# Patient Record
Sex: MALE | Race: OTHER | HISPANIC OR LATINO
[De-identification: names, ages, dates, MRNs, and addresses within clinical notes are randomized per-mention and may not be internally consistent; named-entity substitution may affect disease eponyms.]

---

## 2017-02-01 ENCOUNTER — APPOINTMENT (OUTPATIENT)
Dept: ORTHOPEDIC SURGERY | Facility: CLINIC | Age: 47
End: 2017-02-01

## 2017-02-01 VITALS — WEIGHT: 180 LBS | BODY MASS INDEX: 28.93 KG/M2 | HEIGHT: 66 IN

## 2017-03-04 ENCOUNTER — OUTPATIENT (OUTPATIENT)
Dept: OUTPATIENT SERVICES | Facility: HOSPITAL | Age: 47
LOS: 1 days | End: 2017-03-04

## 2017-03-04 VITALS
RESPIRATION RATE: 16 BRPM | SYSTOLIC BLOOD PRESSURE: 130 MMHG | TEMPERATURE: 97 F | OXYGEN SATURATION: 98 % | WEIGHT: 182.1 LBS | HEART RATE: 88 BPM | DIASTOLIC BLOOD PRESSURE: 84 MMHG | HEIGHT: 66.5 IN

## 2017-03-04 DIAGNOSIS — S83.241A OTHER TEAR OF MEDIAL MENISCUS, CURRENT INJURY, RIGHT KNEE, INITIAL ENCOUNTER: ICD-10-CM

## 2017-03-04 DIAGNOSIS — S83.241D OTHER TEAR OF MEDIAL MENISCUS, CURRENT INJURY, RIGHT KNEE, SUBSEQUENT ENCOUNTER: ICD-10-CM

## 2017-03-04 DIAGNOSIS — Z98.890 OTHER SPECIFIED POSTPROCEDURAL STATES: Chronic | ICD-10-CM

## 2017-03-04 LAB
BUN SERPL-MCNC: 12 MG/DL — SIGNIFICANT CHANGE UP (ref 7–23)
CALCIUM SERPL-MCNC: 9.2 MG/DL — SIGNIFICANT CHANGE UP (ref 8.4–10.5)
CHLORIDE SERPL-SCNC: 102 MMOL/L — SIGNIFICANT CHANGE UP (ref 98–107)
CO2 SERPL-SCNC: 25 MMOL/L — SIGNIFICANT CHANGE UP (ref 22–31)
CREAT SERPL-MCNC: 0.99 MG/DL — SIGNIFICANT CHANGE UP (ref 0.5–1.3)
GLUCOSE SERPL-MCNC: 73 MG/DL — SIGNIFICANT CHANGE UP (ref 70–99)
HCT VFR BLD CALC: 45.6 % — SIGNIFICANT CHANGE UP (ref 39–50)
HGB BLD-MCNC: 16.1 G/DL — SIGNIFICANT CHANGE UP (ref 13–17)
HIV1 AG SER QL: SIGNIFICANT CHANGE UP
HIV1+2 AB SPEC QL: SIGNIFICANT CHANGE UP
MCHC RBC-ENTMCNC: 30.3 PG — SIGNIFICANT CHANGE UP (ref 27–34)
MCHC RBC-ENTMCNC: 35.3 % — SIGNIFICANT CHANGE UP (ref 32–36)
MCV RBC AUTO: 85.7 FL — SIGNIFICANT CHANGE UP (ref 80–100)
PLATELET # BLD AUTO: 295 K/UL — SIGNIFICANT CHANGE UP (ref 150–400)
PMV BLD: 11 FL — SIGNIFICANT CHANGE UP (ref 7–13)
POTASSIUM SERPL-MCNC: 3.8 MMOL/L — SIGNIFICANT CHANGE UP (ref 3.5–5.3)
POTASSIUM SERPL-SCNC: 3.8 MMOL/L — SIGNIFICANT CHANGE UP (ref 3.5–5.3)
RBC # BLD: 5.32 M/UL — SIGNIFICANT CHANGE UP (ref 4.2–5.8)
RBC # FLD: 12.8 % — SIGNIFICANT CHANGE UP (ref 10.3–14.5)
SODIUM SERPL-SCNC: 144 MMOL/L — SIGNIFICANT CHANGE UP (ref 135–145)
WBC # BLD: 7.37 K/UL — SIGNIFICANT CHANGE UP (ref 3.8–10.5)
WBC # FLD AUTO: 7.37 K/UL — SIGNIFICANT CHANGE UP (ref 3.8–10.5)

## 2017-03-04 NOTE — H&P PST ADULT - MUSCULOSKELETAL
details… detailed exam normal strength/ROM intact/no calf tenderness/no joint swelling/normal/no joint erythema/no joint warmth

## 2017-03-04 NOTE — H&P PST ADULT - NSANTHOSAYNRD_GEN_A_CORE
No. LELAND screening performed.  STOP BANG Legend: 0-2 = LOW Risk; 3-4 = INTERMEDIATE Risk; 5-8 = HIGH Risk/never tested

## 2017-03-04 NOTE — H&P PST ADULT - GASTROINTESTINAL DETAILS
no rebound tenderness/bowel sounds normal/no organomegaly/no guarding/no masses palpable/no bruit/no rigidity/soft/normal/no distention/nontender

## 2017-03-04 NOTE — H&P PST ADULT - NEGATIVE GENERAL SYMPTOMS
no weight gain/no anorexia/no weight loss/no fever/no chills/no polydipsia/no fatigue/no polyphagia/no malaise/no sweating/no polyuria

## 2017-03-04 NOTE — H&P PST ADULT - NEGATIVE BREAST SYMPTOMS
no breast tenderness R/no breast lump R/no nipple discharge L/no breast lump L/no nipple discharge R/no breast tenderness L

## 2017-03-04 NOTE — H&P PST ADULT - NEGATIVE NEUROLOGICAL SYMPTOMS
no hemiparesis/no headache/no vertigo/no difficulty walking/no transient paralysis/no generalized seizures/no weakness/no loss of sensation/no facial palsy/no confusion/no tremors/no focal seizures/no loss of consciousness/no paresthesias/no syncope

## 2017-03-04 NOTE — H&P PST ADULT - NEGATIVE ALLERGY TYPES
no outdoor environmental allergies/no reactions to food/no indoor environmental allergies/no reactions to animals/no reactions to medicines/no reactions to insect bites

## 2017-03-04 NOTE — H&P PST ADULT - NEGATIVE GASTROINTESTINAL SYMPTOMS
no abdominal pain/no hematochezia/no constipation/no nausea/no flatulence/no vomiting/no change in bowel habits/no melena/no diarrhea

## 2017-03-04 NOTE — H&P PST ADULT - RS GEN PE MLT RESP DETAILS PC
good air movement/airway patent/clear to auscultation bilaterally/respirations non-labored/normal/breath sounds equal

## 2017-03-04 NOTE — H&P PST ADULT - NEGATIVE MUSCULOSKELETAL SYMPTOMS
no back pain/no arthritis/no neck pain/no stiffness/no arm pain R/no muscle weakness/no muscle cramps/no leg pain L/no arm pain L/no arthralgia/no myalgia

## 2017-03-04 NOTE — H&P PST ADULT - FAMILY HISTORY
Mother  Still living? Yes, Estimated age: Age Unknown  Family history of diabetes mellitus in mother, Age at diagnosis: Age Unknown

## 2017-03-04 NOTE — H&P PST ADULT - NEGATIVE CARDIOVASCULAR SYMPTOMS
no dyspnea on exertion/no peripheral edema/no palpitations/no orthopnea/no chest pain/no paroxysmal nocturnal dyspnea/no claudication

## 2017-03-04 NOTE — H&P PST ADULT - NEGATIVE GENERAL GENITOURINARY SYMPTOMS
normal libido/no hematuria/no bladder infections/no urine discoloration/normal urinary frequency/no urinary hesitancy/no flank pain R/no dysuria/no incontinence/no renal colic/no nocturia/no flank pain L

## 2017-03-04 NOTE — H&P PST ADULT - NEGATIVE OPHTHALMOLOGIC SYMPTOMS
no loss of vision L/no photophobia/no diplopia/no blurred vision L/no discharge R/no discharge L/no irritation R/no lacrimation L/no loss of vision R/no pain R/no scleral injection R/no irritation L/no blurred vision R/no pain L/no scleral injection L/no lacrimation R

## 2017-03-04 NOTE — H&P PST ADULT - NEGATIVE PSYCHIATRIC SYMPTOMS
no mood swings/no suicidal ideation/no auditory hallucinations/no anxiety/no depression/no paranoia/no hyperactivity/no agitation/no visual hallucinations/no insomnia/no memory loss

## 2017-03-04 NOTE — H&P PST ADULT - PROBLEM SELECTOR PLAN 1
pt is scheduled for a rt knee arthroscopic partial medial meniscectomy on 3/15/17. Preop instructions including Pepcid, Hibiclens and NPO status provided. Verbalized understanding.

## 2017-03-04 NOTE — H&P PST ADULT - NEGATIVE SKIN SYMPTOMS
no dryness/no hair loss/no brittle nails/no tumor/no pitted nails/no rash/no itching/no change in size/color of mole

## 2017-03-04 NOTE — H&P PST ADULT - NEGATIVE ENMT SYMPTOMS
no hearing difficulty/no nose bleeds/no post-nasal discharge/no nasal obstruction/no nasal congestion/no tinnitus/no sinus symptoms/no abnormal taste sensation/no dysphagia/no gum bleeding/no recurrent cold sores/no vertigo/no dry mouth/no throat pain/no ear pain/no nasal discharge

## 2017-03-13 ENCOUNTER — APPOINTMENT (OUTPATIENT)
Dept: ORTHOPEDIC SURGERY | Facility: AMBULATORY SURGERY CENTER | Age: 47
End: 2017-03-13

## 2017-03-13 ENCOUNTER — OUTPATIENT (OUTPATIENT)
Dept: OUTPATIENT SERVICES | Facility: HOSPITAL | Age: 47
LOS: 1 days | Discharge: HOME | End: 2017-03-13
Payer: MEDICAID

## 2017-03-13 ENCOUNTER — TRANSCRIPTION ENCOUNTER (OUTPATIENT)
Age: 47
End: 2017-03-13

## 2017-03-13 VITALS
WEIGHT: 182.98 LBS | DIASTOLIC BLOOD PRESSURE: 96 MMHG | RESPIRATION RATE: 16 BRPM | SYSTOLIC BLOOD PRESSURE: 145 MMHG | HEART RATE: 85 BPM | OXYGEN SATURATION: 97 % | TEMPERATURE: 98 F | HEIGHT: 66.5 IN

## 2017-03-13 VITALS
HEART RATE: 90 BPM | SYSTOLIC BLOOD PRESSURE: 104 MMHG | TEMPERATURE: 98 F | OXYGEN SATURATION: 98 % | RESPIRATION RATE: 12 BRPM | DIASTOLIC BLOOD PRESSURE: 72 MMHG

## 2017-03-13 DIAGNOSIS — S83.241D OTHER TEAR OF MEDIAL MENISCUS, CURRENT INJURY, RIGHT KNEE, SUBSEQUENT ENCOUNTER: ICD-10-CM

## 2017-03-13 DIAGNOSIS — Z98.890 OTHER SPECIFIED POSTPROCEDURAL STATES: Chronic | ICD-10-CM

## 2017-03-13 PROCEDURE — 29881 ARTHRS KNE SRG MNISECTMY M/L: CPT | Mod: RT

## 2017-03-13 RX ORDER — OXYCODONE HYDROCHLORIDE 5 MG/1
1 TABLET ORAL
Qty: 50 | Refills: 0
Start: 2017-03-13

## 2017-03-13 NOTE — ASU DISCHARGE PLAN (ADULT/PEDIATRIC). - SPECIAL INSTRUCTIONS
Take over the counter stool softener to help with constipation from pain meds   Increase fluids in diet  Ice pack for 20-30 minutes at a time every 3-4 hrs  Elevate leg for first night to reduce pain and swelling

## 2017-03-13 NOTE — ASU DISCHARGE PLAN (ADULT/PEDIATRIC). - NOTIFY
Bleeding that does not stop/Numbness, color, or temperature change to extremity/Inability to Tolerate Liquids or Foods/Fever greater than 101/Swelling that continues/Pain not relieved by Medications/Persistent Nausea and Vomiting/Unable to Urinate

## 2017-03-13 NOTE — ASU DISCHARGE PLAN (ADULT/PEDIATRIC). - ACTIVITY LEVEL
no sports/gym/no swimming, no hot tubs/weight bearing as tolerated/elevate extremity/no heavy lifting

## 2017-03-23 ENCOUNTER — APPOINTMENT (OUTPATIENT)
Dept: ORTHOPEDIC SURGERY | Facility: CLINIC | Age: 47
End: 2017-03-23

## 2017-03-23 DIAGNOSIS — M65.9 SYNOVITIS AND TENOSYNOVITIS, UNSPECIFIED: ICD-10-CM

## 2017-03-23 RX ORDER — DICLOFENAC SODIUM 75 MG/1
75 TABLET, DELAYED RELEASE ORAL
Qty: 42 | Refills: 0 | Status: ACTIVE | COMMUNITY
Start: 2017-03-23 | End: 1900-01-01

## 2017-04-04 ENCOUNTER — APPOINTMENT (OUTPATIENT)
Dept: ORTHOPEDIC SURGERY | Facility: CLINIC | Age: 47
End: 2017-04-04

## 2017-04-04 DIAGNOSIS — M25.561 PAIN IN RIGHT KNEE: ICD-10-CM

## 2017-04-04 DIAGNOSIS — S83.241D OTHER TEAR OF MEDIAL MENISCUS, CURRENT INJURY, RIGHT KNEE, SUBSEQUENT ENCOUNTER: ICD-10-CM

## 2017-05-10 ENCOUNTER — APPOINTMENT (OUTPATIENT)
Dept: ORTHOPEDIC SURGERY | Facility: CLINIC | Age: 47
End: 2017-05-10

## 2017-05-22 ENCOUNTER — APPOINTMENT (OUTPATIENT)
Dept: ORTHOPEDIC SURGERY | Facility: CLINIC | Age: 47
End: 2017-05-22

## 2017-05-22 DIAGNOSIS — M23.321 OTHER MENISCUS DERANGEMENTS, POSTERIOR HORN OF MEDIAL MENISCUS, RIGHT KNEE: ICD-10-CM

## 2020-11-24 ENCOUNTER — EMERGENCY (EMERGENCY)
Facility: HOSPITAL | Age: 50
LOS: 1 days | Discharge: HOME | End: 2020-11-24
Attending: EMERGENCY MEDICINE | Admitting: EMERGENCY MEDICINE
Payer: MEDICAID

## 2020-11-24 VITALS
OXYGEN SATURATION: 99 % | DIASTOLIC BLOOD PRESSURE: 89 MMHG | HEART RATE: 79 BPM | SYSTOLIC BLOOD PRESSURE: 164 MMHG | RESPIRATION RATE: 20 BRPM

## 2020-11-24 VITALS
RESPIRATION RATE: 20 BRPM | HEIGHT: 66.5 IN | TEMPERATURE: 97 F | OXYGEN SATURATION: 100 % | SYSTOLIC BLOOD PRESSURE: 122 MMHG | HEART RATE: 69 BPM | DIASTOLIC BLOOD PRESSURE: 90 MMHG | WEIGHT: 175.05 LBS

## 2020-11-24 DIAGNOSIS — Z98.890 OTHER SPECIFIED POSTPROCEDURAL STATES: Chronic | ICD-10-CM

## 2020-11-24 LAB
ALBUMIN SERPL ELPH-MCNC: 4.3 G/DL — SIGNIFICANT CHANGE UP (ref 3.3–5)
ALP SERPL-CCNC: 92 U/L — SIGNIFICANT CHANGE UP (ref 30–120)
ALT FLD-CCNC: 23 U/L DA — SIGNIFICANT CHANGE UP (ref 10–60)
ANION GAP SERPL CALC-SCNC: 8 MMOL/L — SIGNIFICANT CHANGE UP (ref 5–17)
APPEARANCE UR: CLEAR — SIGNIFICANT CHANGE UP
AST SERPL-CCNC: 16 U/L — SIGNIFICANT CHANGE UP (ref 10–40)
BASOPHILS # BLD AUTO: 0.04 K/UL — SIGNIFICANT CHANGE UP (ref 0–0.2)
BASOPHILS NFR BLD AUTO: 0.4 % — SIGNIFICANT CHANGE UP (ref 0–2)
BILIRUB SERPL-MCNC: 0.6 MG/DL — SIGNIFICANT CHANGE UP (ref 0.2–1.2)
BILIRUB UR-MCNC: NEGATIVE — SIGNIFICANT CHANGE UP
BUN SERPL-MCNC: 14 MG/DL — SIGNIFICANT CHANGE UP (ref 7–23)
CALCIUM SERPL-MCNC: 9.4 MG/DL — SIGNIFICANT CHANGE UP (ref 8.4–10.5)
CHLORIDE SERPL-SCNC: 103 MMOL/L — SIGNIFICANT CHANGE UP (ref 96–108)
CO2 SERPL-SCNC: 29 MMOL/L — SIGNIFICANT CHANGE UP (ref 22–31)
COLOR SPEC: YELLOW — SIGNIFICANT CHANGE UP
CREAT SERPL-MCNC: 1.09 MG/DL — SIGNIFICANT CHANGE UP (ref 0.5–1.3)
DIFF PNL FLD: ABNORMAL
EOSINOPHIL # BLD AUTO: 0.08 K/UL — SIGNIFICANT CHANGE UP (ref 0–0.5)
EOSINOPHIL NFR BLD AUTO: 0.8 % — SIGNIFICANT CHANGE UP (ref 0–6)
GLUCOSE SERPL-MCNC: 146 MG/DL — HIGH (ref 70–99)
GLUCOSE UR QL: NEGATIVE MG/DL — SIGNIFICANT CHANGE UP
HCT VFR BLD CALC: 46.5 % — SIGNIFICANT CHANGE UP (ref 39–50)
HGB BLD-MCNC: 15.9 G/DL — SIGNIFICANT CHANGE UP (ref 13–17)
IMM GRANULOCYTES NFR BLD AUTO: 0.2 % — SIGNIFICANT CHANGE UP (ref 0–1.5)
KETONES UR-MCNC: NEGATIVE — SIGNIFICANT CHANGE UP
LEUKOCYTE ESTERASE UR-ACNC: NEGATIVE — SIGNIFICANT CHANGE UP
LYMPHOCYTES # BLD AUTO: 2.83 K/UL — SIGNIFICANT CHANGE UP (ref 1–3.3)
LYMPHOCYTES # BLD AUTO: 29.7 % — SIGNIFICANT CHANGE UP (ref 13–44)
MCHC RBC-ENTMCNC: 29.1 PG — SIGNIFICANT CHANGE UP (ref 27–34)
MCHC RBC-ENTMCNC: 34.2 GM/DL — SIGNIFICANT CHANGE UP (ref 32–36)
MCV RBC AUTO: 85 FL — SIGNIFICANT CHANGE UP (ref 80–100)
MONOCYTES # BLD AUTO: 0.68 K/UL — SIGNIFICANT CHANGE UP (ref 0–0.9)
MONOCYTES NFR BLD AUTO: 7.1 % — SIGNIFICANT CHANGE UP (ref 2–14)
NEUTROPHILS # BLD AUTO: 5.88 K/UL — SIGNIFICANT CHANGE UP (ref 1.8–7.4)
NEUTROPHILS NFR BLD AUTO: 61.8 % — SIGNIFICANT CHANGE UP (ref 43–77)
NITRITE UR-MCNC: NEGATIVE — SIGNIFICANT CHANGE UP
NRBC # BLD: 0 /100 WBCS — SIGNIFICANT CHANGE UP (ref 0–0)
PH UR: 7 — SIGNIFICANT CHANGE UP (ref 5–8)
PLATELET # BLD AUTO: 351 K/UL — SIGNIFICANT CHANGE UP (ref 150–400)
POTASSIUM SERPL-MCNC: 3.7 MMOL/L — SIGNIFICANT CHANGE UP (ref 3.5–5.3)
POTASSIUM SERPL-SCNC: 3.7 MMOL/L — SIGNIFICANT CHANGE UP (ref 3.5–5.3)
PROT SERPL-MCNC: 8.4 G/DL — HIGH (ref 6–8.3)
PROT UR-MCNC: NEGATIVE MG/DL — SIGNIFICANT CHANGE UP
RBC # BLD: 5.47 M/UL — SIGNIFICANT CHANGE UP (ref 4.2–5.8)
RBC # FLD: 12.1 % — SIGNIFICANT CHANGE UP (ref 10.3–14.5)
SODIUM SERPL-SCNC: 140 MMOL/L — SIGNIFICANT CHANGE UP (ref 135–145)
SP GR SPEC: 1.01 — SIGNIFICANT CHANGE UP (ref 1.01–1.02)
UROBILINOGEN FLD QL: NEGATIVE MG/DL — SIGNIFICANT CHANGE UP
WBC # BLD: 9.53 K/UL — SIGNIFICANT CHANGE UP (ref 3.8–10.5)
WBC # FLD AUTO: 9.53 K/UL — SIGNIFICANT CHANGE UP (ref 3.8–10.5)

## 2020-11-24 PROCEDURE — 74176 CT ABD & PELVIS W/O CONTRAST: CPT | Mod: 26

## 2020-11-24 PROCEDURE — 87086 URINE CULTURE/COLONY COUNT: CPT

## 2020-11-24 PROCEDURE — 74176 CT ABD & PELVIS W/O CONTRAST: CPT

## 2020-11-24 PROCEDURE — 36415 COLL VENOUS BLD VENIPUNCTURE: CPT

## 2020-11-24 PROCEDURE — 96374 THER/PROPH/DIAG INJ IV PUSH: CPT

## 2020-11-24 PROCEDURE — 96375 TX/PRO/DX INJ NEW DRUG ADDON: CPT

## 2020-11-24 PROCEDURE — 80053 COMPREHEN METABOLIC PANEL: CPT

## 2020-11-24 PROCEDURE — 85025 COMPLETE CBC W/AUTO DIFF WBC: CPT

## 2020-11-24 PROCEDURE — 99284 EMERGENCY DEPT VISIT MOD MDM: CPT

## 2020-11-24 PROCEDURE — 96361 HYDRATE IV INFUSION ADD-ON: CPT

## 2020-11-24 PROCEDURE — 81001 URINALYSIS AUTO W/SCOPE: CPT

## 2020-11-24 PROCEDURE — 99284 EMERGENCY DEPT VISIT MOD MDM: CPT | Mod: 25

## 2020-11-24 RX ORDER — CEFUROXIME AXETIL 250 MG
500 TABLET ORAL ONCE
Refills: 0 | Status: COMPLETED | OUTPATIENT
Start: 2020-11-24 | End: 2020-11-24

## 2020-11-24 RX ORDER — KETOROLAC TROMETHAMINE 30 MG/ML
15 SYRINGE (ML) INJECTION ONCE
Refills: 0 | Status: DISCONTINUED | OUTPATIENT
Start: 2020-11-24 | End: 2020-11-24

## 2020-11-24 RX ORDER — ONDANSETRON 8 MG/1
4 TABLET, FILM COATED ORAL ONCE
Refills: 0 | Status: COMPLETED | OUTPATIENT
Start: 2020-11-24 | End: 2020-11-24

## 2020-11-24 RX ORDER — CEFUROXIME AXETIL 250 MG
1 TABLET ORAL
Qty: 14 | Refills: 0
Start: 2020-11-24 | End: 2020-11-30

## 2020-11-24 RX ORDER — OXYCODONE AND ACETAMINOPHEN 5; 325 MG/1; MG/1
1 TABLET ORAL
Qty: 12 | Refills: 0
Start: 2020-11-24 | End: 2020-11-26

## 2020-11-24 RX ORDER — SODIUM CHLORIDE 9 MG/ML
1000 INJECTION INTRAMUSCULAR; INTRAVENOUS; SUBCUTANEOUS ONCE
Refills: 0 | Status: COMPLETED | OUTPATIENT
Start: 2020-11-24 | End: 2020-11-24

## 2020-11-24 RX ORDER — TAMSULOSIN HYDROCHLORIDE 0.4 MG/1
1 CAPSULE ORAL
Qty: 10 | Refills: 0
Start: 2020-11-24 | End: 2020-12-03

## 2020-11-24 RX ORDER — ONDANSETRON 8 MG/1
1 TABLET, FILM COATED ORAL
Qty: 9 | Refills: 0
Start: 2020-11-24 | End: 2020-11-26

## 2020-11-24 RX ADMIN — Medication 500 MILLIGRAM(S): at 08:17

## 2020-11-24 RX ADMIN — Medication 15 MILLIGRAM(S): at 06:38

## 2020-11-24 RX ADMIN — ONDANSETRON 4 MILLIGRAM(S): 8 TABLET, FILM COATED ORAL at 06:12

## 2020-11-24 RX ADMIN — SODIUM CHLORIDE 1000 MILLILITER(S): 9 INJECTION INTRAMUSCULAR; INTRAVENOUS; SUBCUTANEOUS at 06:24

## 2020-11-24 RX ADMIN — Medication 15 MILLIGRAM(S): at 06:25

## 2020-11-24 RX ADMIN — SODIUM CHLORIDE 1000 MILLILITER(S): 9 INJECTION INTRAMUSCULAR; INTRAVENOUS; SUBCUTANEOUS at 07:18

## 2020-11-24 NOTE — ED PROVIDER NOTE - CARE PROVIDER_API CALL
Gurwinder Lund)  Urology  [address], [address]  [place] [address]  Phone: [phone]  Fax: [phone]  Follow Up Time: 1-3 Days

## 2020-11-24 NOTE — ED PROVIDER NOTE - PATIENT PORTAL LINK FT
You can access the FollowMyHealth Patient Portal offered by [facility] by registering at the following website: [email or web address]. By joining [employer]’s FollowMyHealth portal, you will also be able to view your health information using other applications (apps) compatible with our system.

## 2020-11-24 NOTE — ED PROVIDER NOTE - OBJECTIVE STATEMENT
50 y.o. M c/o right flank pain starting about 1 hr ago, lower flank into groin, associated with urinary urgency, no fever, no n/v, no dysuria, no hematuria, does not feel like msk back pain, 50 y.o. M c/o right flank pain starting about 1 hr ago, lower flank into groin, associated with urinary urgency, no fever, no n/v, no dysuria, no hematuria, does not feel like msk back pain, never had anything like this before

## 2020-11-24 NOTE — ED PROVIDER NOTE - CLINICAL SUMMARY MEDICAL DECISION MAKING FREE TEXT BOX
50 y.o. M c/o acute right flank pain - likely renal stone - iv, fluids, pain control, ct eval for stone, labs/ua

## 2020-11-24 NOTE — ED ADULT TRIAGE NOTE - CHIEF COMPLAINT QUOTE
I have this left lower back pain, I have an urge to pee but I cant I have this left lower back pain, I have an urge to pee but I cant.

## 2020-11-24 NOTE — ED PROVIDER NOTE - NSFOLLOWUPINSTRUCTIONS_ED_ALL_ED_FT
Kidney Stones    WHAT YOU NEED TO KNOW:    Kidney stones form in the urinary system when the water and waste in your urine are out of balance. When this happens, certain types of waste crystals separate from the urine. The crystals build up and form kidney stones. You may have more than one kidney stone.     Kidney Stones          DISCHARGE INSTRUCTIONS:    Return to the emergency department if:   •You have vomiting that is not relieved by medicine.          Contact your healthcare provider if:   •You have a fever.       •You have trouble passing urine.      •You see blood in your urine.      •You have severe pain.      •You have any questions or concerns about your condition or care.      Medicines:   •NSAIDs, such as ibuprofen, help decrease swelling, pain, and fever. This medicine is available with or without a doctor's order. NSAIDs can cause stomach bleeding or kidney problems in certain people. If you take blood thinner medicine, always ask your healthcare provider if NSAIDs are safe for you. Always read the medicine label and follow directions.      •Prescription pain medicine may be given. Ask your healthcare provider how to take this medicine safely. Some prescription pain medicines contain acetaminophen. Do not take other medicines that contain acetaminophen without talking to your healthcare provider. Too much acetaminophen may cause liver damage. Prescription pain medicine may cause constipation. Ask your healthcare provider how to prevent or treat constipation.       •Medicines to balance your electrolytes may be needed.       •Take your medicine as directed. Contact your healthcare provider if you think your medicine is not helping or if you have side effects. Tell him or her if you are allergic to any medicine. Keep a list of the medicines, vitamins, and herbs you take. Include the amounts, and when and why you take them. Bring the list or the pill bottles to follow-up visits. Carry your medicine list with you in case of an emergency.      Follow up with your healthcare provider as directed: You may need to return for more tests. Write down your questions so you remember to ask them during your visits.    What you can do to manage kidney stones:   •Drink more liquids. Your healthcare provider may tell you to drink at least 8 to 12 (eight-ounce) cups of liquids each day. This helps flush out the kidney stones when you urinate. Water is the best liquid to drink.      •Strain your urine every time you go to the bathroom. Urinate through a strainer or a piece of thin cloth to catch the stones. Take the stones to your healthcare provider so they can be sent to the lab for tests. This will help your healthcare providers plan the best treatment for you.  Look for Stones in the Filter           •Eat a variety of healthy foods. Healthy foods include fruits, vegetables, whole-grain breads, low-fat dairy products, beans, and fish. You may need to limit how much sodium (salt) or protein you eat. Ask for information about the best foods for you.      •Stay active. Your stones may pass more easily if you stay active. Exercise can also help you manage your weight. Ask about the best activities for you.      After you pass the kidney stones: Your healthcare provider may order a 24-hour urine test. Results from a 24-hour urine test will help your healthcare provider plan ways to prevent more stones from forming. Your healthcare provider will give you more instructions.

## 2020-11-25 LAB
CULTURE RESULTS: SIGNIFICANT CHANGE UP
SPECIMEN SOURCE: SIGNIFICANT CHANGE UP

## 2020-11-29 ENCOUNTER — EMERGENCY (EMERGENCY)
Facility: HOSPITAL | Age: 50
LOS: 1 days | Discharge: ANOTHER HOSPITAL | End: 2020-11-29
Attending: EMERGENCY MEDICINE | Admitting: EMERGENCY MEDICINE
Payer: MEDICAID

## 2020-11-29 ENCOUNTER — INPATIENT (INPATIENT)
Facility: HOSPITAL | Age: 50
LOS: 1 days | Discharge: HOME | DRG: 854 | End: 2020-12-01
Attending: FAMILY MEDICINE | Admitting: HOSPITALIST
Payer: MEDICAID

## 2020-11-29 ENCOUNTER — TRANSCRIPTION ENCOUNTER (OUTPATIENT)
Age: 50
End: 2020-11-29

## 2020-11-29 VITALS
TEMPERATURE: 98 F | DIASTOLIC BLOOD PRESSURE: 95 MMHG | OXYGEN SATURATION: 98 % | RESPIRATION RATE: 20 BRPM | SYSTOLIC BLOOD PRESSURE: 153 MMHG | HEIGHT: 66.5 IN | HEART RATE: 113 BPM | WEIGHT: 175.05 LBS

## 2020-11-29 VITALS
OXYGEN SATURATION: 100 % | DIASTOLIC BLOOD PRESSURE: 76 MMHG | RESPIRATION RATE: 22 BRPM | HEART RATE: 108 BPM | SYSTOLIC BLOOD PRESSURE: 156 MMHG

## 2020-11-29 VITALS
HEIGHT: 66 IN | DIASTOLIC BLOOD PRESSURE: 81 MMHG | RESPIRATION RATE: 15 BRPM | WEIGHT: 175.05 LBS | OXYGEN SATURATION: 96 % | HEART RATE: 112 BPM | TEMPERATURE: 97 F | SYSTOLIC BLOOD PRESSURE: 141 MMHG

## 2020-11-29 DIAGNOSIS — Z98.890 OTHER SPECIFIED POSTPROCEDURAL STATES: Chronic | ICD-10-CM

## 2020-11-29 DIAGNOSIS — N23 UNSPECIFIED RENAL COLIC: ICD-10-CM

## 2020-11-29 DIAGNOSIS — N17.9 ACUTE KIDNEY FAILURE, UNSPECIFIED: ICD-10-CM

## 2020-11-29 DIAGNOSIS — Z29.9 ENCOUNTER FOR PROPHYLACTIC MEASURES, UNSPECIFIED: ICD-10-CM

## 2020-11-29 DIAGNOSIS — I10 ESSENTIAL (PRIMARY) HYPERTENSION: ICD-10-CM

## 2020-11-29 DIAGNOSIS — A41.9 SEPSIS, UNSPECIFIED ORGANISM: ICD-10-CM

## 2020-11-29 DIAGNOSIS — N20.0 CALCULUS OF KIDNEY: ICD-10-CM

## 2020-11-29 DIAGNOSIS — E87.1 HYPO-OSMOLALITY AND HYPONATREMIA: ICD-10-CM

## 2020-11-29 DIAGNOSIS — K59.00 CONSTIPATION, UNSPECIFIED: ICD-10-CM

## 2020-11-29 LAB
ALBUMIN SERPL ELPH-MCNC: 2.6 G/DL — LOW (ref 3.3–5)
ALP SERPL-CCNC: 162 U/L — HIGH (ref 40–120)
ALT FLD-CCNC: 33 U/L — SIGNIFICANT CHANGE UP (ref 12–78)
ANION GAP SERPL CALC-SCNC: 10 MMOL/L — SIGNIFICANT CHANGE UP (ref 5–17)
ANION GAP SERPL CALC-SCNC: 6 MMOL/L — SIGNIFICANT CHANGE UP (ref 5–17)
ANION GAP SERPL CALC-SCNC: 7 MMOL/L — SIGNIFICANT CHANGE UP (ref 5–17)
APPEARANCE UR: CLEAR — SIGNIFICANT CHANGE UP
AST SERPL-CCNC: 15 U/L — SIGNIFICANT CHANGE UP (ref 15–37)
BASOPHILS # BLD AUTO: 0.02 K/UL — SIGNIFICANT CHANGE UP (ref 0–0.2)
BASOPHILS # BLD AUTO: 0.03 K/UL — SIGNIFICANT CHANGE UP (ref 0–0.2)
BASOPHILS # BLD AUTO: 0.04 K/UL — SIGNIFICANT CHANGE UP (ref 0–0.2)
BASOPHILS NFR BLD AUTO: 0.1 % — SIGNIFICANT CHANGE UP (ref 0–2)
BASOPHILS NFR BLD AUTO: 0.1 % — SIGNIFICANT CHANGE UP (ref 0–2)
BASOPHILS NFR BLD AUTO: 0.2 % — SIGNIFICANT CHANGE UP (ref 0–2)
BILIRUB SERPL-MCNC: 2.3 MG/DL — HIGH (ref 0.2–1.2)
BILIRUB UR-MCNC: NEGATIVE — SIGNIFICANT CHANGE UP
BUN SERPL-MCNC: 13 MG/DL — SIGNIFICANT CHANGE UP (ref 7–23)
BUN SERPL-MCNC: 15 MG/DL — SIGNIFICANT CHANGE UP (ref 7–23)
BUN SERPL-MCNC: 17 MG/DL — SIGNIFICANT CHANGE UP (ref 7–23)
CALCIUM SERPL-MCNC: 8.5 MG/DL — SIGNIFICANT CHANGE UP (ref 8.5–10.1)
CALCIUM SERPL-MCNC: 8.5 MG/DL — SIGNIFICANT CHANGE UP (ref 8.5–10.1)
CALCIUM SERPL-MCNC: 9.3 MG/DL — SIGNIFICANT CHANGE UP (ref 8.4–10.5)
CHLORIDE SERPL-SCNC: 102 MMOL/L — SIGNIFICANT CHANGE UP (ref 96–108)
CHLORIDE SERPL-SCNC: 106 MMOL/L — SIGNIFICANT CHANGE UP (ref 96–108)
CHLORIDE SERPL-SCNC: 89 MMOL/L — LOW (ref 96–108)
CO2 SERPL-SCNC: 25 MMOL/L — SIGNIFICANT CHANGE UP (ref 22–31)
CO2 SERPL-SCNC: 27 MMOL/L — SIGNIFICANT CHANGE UP (ref 22–31)
CO2 SERPL-SCNC: 28 MMOL/L — SIGNIFICANT CHANGE UP (ref 22–31)
COLOR SPEC: YELLOW — SIGNIFICANT CHANGE UP
CREAT SERPL-MCNC: 1.7 MG/DL — HIGH (ref 0.5–1.3)
CREAT SERPL-MCNC: 1.84 MG/DL — HIGH (ref 0.5–1.3)
CREAT SERPL-MCNC: 2 MG/DL — HIGH (ref 0.5–1.3)
DIFF PNL FLD: ABNORMAL
EOSINOPHIL # BLD AUTO: 0.03 K/UL — SIGNIFICANT CHANGE UP (ref 0–0.5)
EOSINOPHIL # BLD AUTO: 0.03 K/UL — SIGNIFICANT CHANGE UP (ref 0–0.5)
EOSINOPHIL # BLD AUTO: 0.12 K/UL — SIGNIFICANT CHANGE UP (ref 0–0.5)
EOSINOPHIL NFR BLD AUTO: 0.1 % — SIGNIFICANT CHANGE UP (ref 0–6)
EOSINOPHIL NFR BLD AUTO: 0.1 % — SIGNIFICANT CHANGE UP (ref 0–6)
EOSINOPHIL NFR BLD AUTO: 0.7 % — SIGNIFICANT CHANGE UP (ref 0–6)
GLUCOSE SERPL-MCNC: 102 MG/DL — HIGH (ref 70–99)
GLUCOSE SERPL-MCNC: 129 MG/DL — HIGH (ref 70–99)
GLUCOSE SERPL-MCNC: 159 MG/DL — HIGH (ref 70–99)
GLUCOSE UR QL: NEGATIVE MG/DL — SIGNIFICANT CHANGE UP
HCT VFR BLD CALC: 33.6 % — LOW (ref 39–50)
HCT VFR BLD CALC: 35.2 % — LOW (ref 39–50)
HCT VFR BLD CALC: 39.5 % — SIGNIFICANT CHANGE UP (ref 39–50)
HGB BLD-MCNC: 11.6 G/DL — LOW (ref 13–17)
HGB BLD-MCNC: 12.3 G/DL — LOW (ref 13–17)
HGB BLD-MCNC: 13.9 G/DL — SIGNIFICANT CHANGE UP (ref 13–17)
IMM GRANULOCYTES NFR BLD AUTO: 0.8 % — SIGNIFICANT CHANGE UP (ref 0–1.5)
IMM GRANULOCYTES NFR BLD AUTO: 0.8 % — SIGNIFICANT CHANGE UP (ref 0–1.5)
IMM GRANULOCYTES NFR BLD AUTO: 1.3 % — SIGNIFICANT CHANGE UP (ref 0–1.5)
KETONES UR-MCNC: NEGATIVE — SIGNIFICANT CHANGE UP
LACTATE SERPL-SCNC: 0.8 MMOL/L — SIGNIFICANT CHANGE UP (ref 0.7–2)
LEUKOCYTE ESTERASE UR-ACNC: NEGATIVE — SIGNIFICANT CHANGE UP
LYMPHOCYTES # BLD AUTO: 0.53 K/UL — LOW (ref 1–3.3)
LYMPHOCYTES # BLD AUTO: 0.56 K/UL — LOW (ref 1–3.3)
LYMPHOCYTES # BLD AUTO: 1.15 K/UL — SIGNIFICANT CHANGE UP (ref 1–3.3)
LYMPHOCYTES # BLD AUTO: 2.8 % — LOW (ref 13–44)
LYMPHOCYTES # BLD AUTO: 3.1 % — LOW (ref 13–44)
LYMPHOCYTES # BLD AUTO: 4.8 % — LOW (ref 13–44)
MCHC RBC-ENTMCNC: 29.3 PG — SIGNIFICANT CHANGE UP (ref 27–34)
MCHC RBC-ENTMCNC: 29.4 PG — SIGNIFICANT CHANGE UP (ref 27–34)
MCHC RBC-ENTMCNC: 29.5 PG — SIGNIFICANT CHANGE UP (ref 27–34)
MCHC RBC-ENTMCNC: 34.5 GM/DL — SIGNIFICANT CHANGE UP (ref 32–36)
MCHC RBC-ENTMCNC: 34.9 GM/DL — SIGNIFICANT CHANGE UP (ref 32–36)
MCHC RBC-ENTMCNC: 35.2 GM/DL — SIGNIFICANT CHANGE UP (ref 32–36)
MCV RBC AUTO: 83.3 FL — SIGNIFICANT CHANGE UP (ref 80–100)
MCV RBC AUTO: 84 FL — SIGNIFICANT CHANGE UP (ref 80–100)
MCV RBC AUTO: 85.5 FL — SIGNIFICANT CHANGE UP (ref 80–100)
MONOCYTES # BLD AUTO: 1.25 K/UL — HIGH (ref 0–0.9)
MONOCYTES # BLD AUTO: 1.49 K/UL — HIGH (ref 0–0.9)
MONOCYTES # BLD AUTO: 2.06 K/UL — HIGH (ref 0–0.9)
MONOCYTES NFR BLD AUTO: 7.4 % — SIGNIFICANT CHANGE UP (ref 2–14)
MONOCYTES NFR BLD AUTO: 7.4 % — SIGNIFICANT CHANGE UP (ref 2–14)
MONOCYTES NFR BLD AUTO: 8.6 % — SIGNIFICANT CHANGE UP (ref 2–14)
NEUTROPHILS # BLD AUTO: 14.92 K/UL — HIGH (ref 1.8–7.4)
NEUTROPHILS # BLD AUTO: 17.76 K/UL — HIGH (ref 1.8–7.4)
NEUTROPHILS # BLD AUTO: 20.41 K/UL — HIGH (ref 1.8–7.4)
NEUTROPHILS NFR BLD AUTO: 85.5 % — HIGH (ref 43–77)
NEUTROPHILS NFR BLD AUTO: 87.9 % — HIGH (ref 43–77)
NEUTROPHILS NFR BLD AUTO: 88.3 % — HIGH (ref 43–77)
NITRITE UR-MCNC: NEGATIVE — SIGNIFICANT CHANGE UP
NRBC # BLD: 0 /100 WBCS — SIGNIFICANT CHANGE UP (ref 0–0)
PH UR: 7 — SIGNIFICANT CHANGE UP (ref 5–8)
PLATELET # BLD AUTO: 283 K/UL — SIGNIFICANT CHANGE UP (ref 150–400)
PLATELET # BLD AUTO: 291 K/UL — SIGNIFICANT CHANGE UP (ref 150–400)
PLATELET # BLD AUTO: 303 K/UL — SIGNIFICANT CHANGE UP (ref 150–400)
POTASSIUM SERPL-MCNC: 3.5 MMOL/L — SIGNIFICANT CHANGE UP (ref 3.5–5.3)
POTASSIUM SERPL-MCNC: 3.9 MMOL/L — SIGNIFICANT CHANGE UP (ref 3.5–5.3)
POTASSIUM SERPL-MCNC: 4.1 MMOL/L — SIGNIFICANT CHANGE UP (ref 3.5–5.3)
POTASSIUM SERPL-SCNC: 3.5 MMOL/L — SIGNIFICANT CHANGE UP (ref 3.5–5.3)
POTASSIUM SERPL-SCNC: 3.9 MMOL/L — SIGNIFICANT CHANGE UP (ref 3.5–5.3)
POTASSIUM SERPL-SCNC: 4.1 MMOL/L — SIGNIFICANT CHANGE UP (ref 3.5–5.3)
PROT SERPL-MCNC: 6.9 G/DL — SIGNIFICANT CHANGE UP (ref 6–8.3)
PROT UR-MCNC: 15 MG/DL
RBC # BLD: 3.93 M/UL — LOW (ref 4.2–5.8)
RBC # BLD: 4.19 M/UL — LOW (ref 4.2–5.8)
RBC # BLD: 4.74 M/UL — SIGNIFICANT CHANGE UP (ref 4.2–5.8)
RBC # FLD: 12.3 % — SIGNIFICANT CHANGE UP (ref 10.3–14.5)
RBC # FLD: 12.7 % — SIGNIFICANT CHANGE UP (ref 10.3–14.5)
RBC # FLD: 13 % — SIGNIFICANT CHANGE UP (ref 10.3–14.5)
SARS-COV-2 IGG SERPL QL IA: NEGATIVE — SIGNIFICANT CHANGE UP
SARS-COV-2 IGM SERPL IA-ACNC: 0.08 INDEX — SIGNIFICANT CHANGE UP
SARS-COV-2 RNA SPEC QL NAA+PROBE: SIGNIFICANT CHANGE UP
SODIUM SERPL-SCNC: 124 MMOL/L — LOW (ref 135–145)
SODIUM SERPL-SCNC: 136 MMOL/L — SIGNIFICANT CHANGE UP (ref 135–145)
SODIUM SERPL-SCNC: 140 MMOL/L — SIGNIFICANT CHANGE UP (ref 135–145)
SP GR SPEC: 1 — LOW (ref 1.01–1.02)
UROBILINOGEN FLD QL: NEGATIVE MG/DL — SIGNIFICANT CHANGE UP
WBC # BLD: 16.98 K/UL — HIGH (ref 3.8–10.5)
WBC # BLD: 20.13 K/UL — HIGH (ref 3.8–10.5)
WBC # BLD: 23.88 K/UL — HIGH (ref 3.8–10.5)
WBC # FLD AUTO: 16.98 K/UL — HIGH (ref 3.8–10.5)
WBC # FLD AUTO: 20.13 K/UL — HIGH (ref 3.8–10.5)
WBC # FLD AUTO: 23.88 K/UL — HIGH (ref 3.8–10.5)

## 2020-11-29 PROCEDURE — 99223 1ST HOSP IP/OBS HIGH 75: CPT | Mod: GC

## 2020-11-29 PROCEDURE — 74176 CT ABD & PELVIS W/O CONTRAST: CPT | Mod: 26

## 2020-11-29 PROCEDURE — 96375 TX/PRO/DX INJ NEW DRUG ADDON: CPT

## 2020-11-29 PROCEDURE — 99285 EMERGENCY DEPT VISIT HI MDM: CPT | Mod: 25

## 2020-11-29 PROCEDURE — 96365 THER/PROPH/DIAG IV INF INIT: CPT

## 2020-11-29 PROCEDURE — 85025 COMPLETE CBC W/AUTO DIFF WBC: CPT

## 2020-11-29 PROCEDURE — 99283 EMERGENCY DEPT VISIT LOW MDM: CPT

## 2020-11-29 PROCEDURE — 93010 ELECTROCARDIOGRAM REPORT: CPT

## 2020-11-29 PROCEDURE — 99233 SBSQ HOSP IP/OBS HIGH 50: CPT | Mod: GC

## 2020-11-29 PROCEDURE — 81001 URINALYSIS AUTO W/SCOPE: CPT

## 2020-11-29 PROCEDURE — 80048 BASIC METABOLIC PNL TOTAL CA: CPT

## 2020-11-29 PROCEDURE — 99285 EMERGENCY DEPT VISIT HI MDM: CPT

## 2020-11-29 PROCEDURE — 96361 HYDRATE IV INFUSION ADD-ON: CPT

## 2020-11-29 PROCEDURE — 36415 COLL VENOUS BLD VENIPUNCTURE: CPT

## 2020-11-29 PROCEDURE — 74176 CT ABD & PELVIS W/O CONTRAST: CPT

## 2020-11-29 RX ORDER — MULTIVIT WITH MIN/MFOLATE/K2 340-15/3 G
1 POWDER (GRAM) ORAL ONCE
Refills: 0 | Status: COMPLETED | OUTPATIENT
Start: 2020-11-29 | End: 2020-11-29

## 2020-11-29 RX ORDER — ACETAMINOPHEN 500 MG
1000 TABLET ORAL EVERY 6 HOURS
Refills: 0 | Status: DISCONTINUED | OUTPATIENT
Start: 2020-11-29 | End: 2020-11-29

## 2020-11-29 RX ORDER — HYDROMORPHONE HYDROCHLORIDE 2 MG/ML
1 INJECTION INTRAMUSCULAR; INTRAVENOUS; SUBCUTANEOUS EVERY 4 HOURS
Refills: 0 | Status: DISCONTINUED | OUTPATIENT
Start: 2020-11-29 | End: 2020-11-30

## 2020-11-29 RX ORDER — ENOXAPARIN SODIUM 100 MG/ML
40 INJECTION SUBCUTANEOUS DAILY
Refills: 0 | Status: DISCONTINUED | OUTPATIENT
Start: 2020-11-29 | End: 2020-11-30

## 2020-11-29 RX ORDER — KETOROLAC TROMETHAMINE 30 MG/ML
15 SYRINGE (ML) INJECTION ONCE
Refills: 0 | Status: DISCONTINUED | OUTPATIENT
Start: 2020-11-29 | End: 2020-11-29

## 2020-11-29 RX ORDER — HYDROMORPHONE HYDROCHLORIDE 2 MG/ML
0.5 INJECTION INTRAMUSCULAR; INTRAVENOUS; SUBCUTANEOUS ONCE
Refills: 0 | Status: DISCONTINUED | OUTPATIENT
Start: 2020-11-29 | End: 2020-11-29

## 2020-11-29 RX ORDER — SODIUM CHLORIDE 9 MG/ML
1000 INJECTION INTRAMUSCULAR; INTRAVENOUS; SUBCUTANEOUS
Refills: 0 | Status: DISCONTINUED | OUTPATIENT
Start: 2020-11-29 | End: 2020-11-29

## 2020-11-29 RX ORDER — ACETAMINOPHEN 500 MG
1000 TABLET ORAL EVERY 6 HOURS
Refills: 0 | Status: COMPLETED | OUTPATIENT
Start: 2020-11-29 | End: 2020-11-29

## 2020-11-29 RX ORDER — SODIUM CHLORIDE 9 MG/ML
1000 INJECTION INTRAMUSCULAR; INTRAVENOUS; SUBCUTANEOUS ONCE
Refills: 0 | Status: COMPLETED | OUTPATIENT
Start: 2020-11-29 | End: 2020-11-29

## 2020-11-29 RX ORDER — HYDROMORPHONE HYDROCHLORIDE 2 MG/ML
0.5 INJECTION INTRAMUSCULAR; INTRAVENOUS; SUBCUTANEOUS EVERY 6 HOURS
Refills: 0 | Status: DISCONTINUED | OUTPATIENT
Start: 2020-11-29 | End: 2020-11-30

## 2020-11-29 RX ORDER — SODIUM CHLORIDE 9 MG/ML
1000 INJECTION INTRAMUSCULAR; INTRAVENOUS; SUBCUTANEOUS
Refills: 0 | Status: DISCONTINUED | OUTPATIENT
Start: 2020-11-29 | End: 2020-11-30

## 2020-11-29 RX ORDER — HYDROMORPHONE HYDROCHLORIDE 2 MG/ML
0.5 INJECTION INTRAMUSCULAR; INTRAVENOUS; SUBCUTANEOUS EVERY 6 HOURS
Refills: 0 | Status: DISCONTINUED | OUTPATIENT
Start: 2020-11-29 | End: 2020-11-29

## 2020-11-29 RX ORDER — SENNA PLUS 8.6 MG/1
2 TABLET ORAL AT BEDTIME
Refills: 0 | Status: DISCONTINUED | OUTPATIENT
Start: 2020-11-29 | End: 2020-11-30

## 2020-11-29 RX ORDER — PIPERACILLIN AND TAZOBACTAM 4; .5 G/20ML; G/20ML
3.38 INJECTION, POWDER, LYOPHILIZED, FOR SOLUTION INTRAVENOUS ONCE
Refills: 0 | Status: COMPLETED | OUTPATIENT
Start: 2020-11-29 | End: 2020-11-29

## 2020-11-29 RX ORDER — PIPERACILLIN AND TAZOBACTAM 4; .5 G/20ML; G/20ML
3.38 INJECTION, POWDER, LYOPHILIZED, FOR SOLUTION INTRAVENOUS EVERY 8 HOURS
Refills: 0 | Status: DISCONTINUED | OUTPATIENT
Start: 2020-11-29 | End: 2020-11-30

## 2020-11-29 RX ORDER — ONDANSETRON 8 MG/1
4 TABLET, FILM COATED ORAL EVERY 6 HOURS
Refills: 0 | Status: DISCONTINUED | OUTPATIENT
Start: 2020-11-29 | End: 2020-11-30

## 2020-11-29 RX ORDER — METOPROLOL TARTRATE 50 MG
25 TABLET ORAL ONCE
Refills: 0 | Status: COMPLETED | OUTPATIENT
Start: 2020-11-29 | End: 2020-11-29

## 2020-11-29 RX ORDER — ACETAMINOPHEN 500 MG
650 TABLET ORAL EVERY 6 HOURS
Refills: 0 | Status: DISCONTINUED | OUTPATIENT
Start: 2020-11-29 | End: 2020-11-29

## 2020-11-29 RX ORDER — INFLUENZA VIRUS VACCINE 15; 15; 15; 15 UG/.5ML; UG/.5ML; UG/.5ML; UG/.5ML
0.5 SUSPENSION INTRAMUSCULAR ONCE
Refills: 0 | Status: DISCONTINUED | OUTPATIENT
Start: 2020-11-29 | End: 2020-12-01

## 2020-11-29 RX ORDER — HYDROMORPHONE HYDROCHLORIDE 2 MG/ML
0.5 INJECTION INTRAMUSCULAR; INTRAVENOUS; SUBCUTANEOUS EVERY 4 HOURS
Refills: 0 | Status: DISCONTINUED | OUTPATIENT
Start: 2020-11-29 | End: 2020-11-29

## 2020-11-29 RX ADMIN — HYDROMORPHONE HYDROCHLORIDE 1 MILLIGRAM(S): 2 INJECTION INTRAMUSCULAR; INTRAVENOUS; SUBCUTANEOUS at 21:05

## 2020-11-29 RX ADMIN — PIPERACILLIN AND TAZOBACTAM 200 GRAM(S): 4; .5 INJECTION, POWDER, LYOPHILIZED, FOR SOLUTION INTRAVENOUS at 02:18

## 2020-11-29 RX ADMIN — PIPERACILLIN AND TAZOBACTAM 25 GRAM(S): 4; .5 INJECTION, POWDER, LYOPHILIZED, FOR SOLUTION INTRAVENOUS at 23:14

## 2020-11-29 RX ADMIN — PIPERACILLIN AND TAZOBACTAM 3.38 GRAM(S): 4; .5 INJECTION, POWDER, LYOPHILIZED, FOR SOLUTION INTRAVENOUS at 02:40

## 2020-11-29 RX ADMIN — Medication 400 MILLIGRAM(S): at 11:46

## 2020-11-29 RX ADMIN — SODIUM CHLORIDE 1000 MILLILITER(S): 9 INJECTION INTRAMUSCULAR; INTRAVENOUS; SUBCUTANEOUS at 00:47

## 2020-11-29 RX ADMIN — HYDROMORPHONE HYDROCHLORIDE 1 MILLIGRAM(S): 2 INJECTION INTRAMUSCULAR; INTRAVENOUS; SUBCUTANEOUS at 15:48

## 2020-11-29 RX ADMIN — HYDROMORPHONE HYDROCHLORIDE 0.5 MILLIGRAM(S): 2 INJECTION INTRAMUSCULAR; INTRAVENOUS; SUBCUTANEOUS at 03:14

## 2020-11-29 RX ADMIN — Medication 15 MILLIGRAM(S): at 00:46

## 2020-11-29 RX ADMIN — HYDROMORPHONE HYDROCHLORIDE 1 MILLIGRAM(S): 2 INJECTION INTRAMUSCULAR; INTRAVENOUS; SUBCUTANEOUS at 16:48

## 2020-11-29 RX ADMIN — PIPERACILLIN AND TAZOBACTAM 25 GRAM(S): 4; .5 INJECTION, POWDER, LYOPHILIZED, FOR SOLUTION INTRAVENOUS at 15:35

## 2020-11-29 RX ADMIN — ENOXAPARIN SODIUM 40 MILLIGRAM(S): 100 INJECTION SUBCUTANEOUS at 11:45

## 2020-11-29 RX ADMIN — HYDROMORPHONE HYDROCHLORIDE 0.5 MILLIGRAM(S): 2 INJECTION INTRAMUSCULAR; INTRAVENOUS; SUBCUTANEOUS at 07:05

## 2020-11-29 RX ADMIN — SODIUM CHLORIDE 1000 MILLILITER(S): 9 INJECTION INTRAMUSCULAR; INTRAVENOUS; SUBCUTANEOUS at 01:40

## 2020-11-29 RX ADMIN — SODIUM CHLORIDE 1000 MILLILITER(S): 9 INJECTION INTRAMUSCULAR; INTRAVENOUS; SUBCUTANEOUS at 01:46

## 2020-11-29 RX ADMIN — Medication 1 BOTTLE: at 00:46

## 2020-11-29 RX ADMIN — SODIUM CHLORIDE 75 MILLILITER(S): 9 INJECTION INTRAMUSCULAR; INTRAVENOUS; SUBCUTANEOUS at 06:23

## 2020-11-29 RX ADMIN — HYDROMORPHONE HYDROCHLORIDE 1 MILLIGRAM(S): 2 INJECTION INTRAMUSCULAR; INTRAVENOUS; SUBCUTANEOUS at 20:52

## 2020-11-29 RX ADMIN — SODIUM CHLORIDE 1000 MILLILITER(S): 9 INJECTION INTRAMUSCULAR; INTRAVENOUS; SUBCUTANEOUS at 00:46

## 2020-11-29 RX ADMIN — HYDROMORPHONE HYDROCHLORIDE 0.5 MILLIGRAM(S): 2 INJECTION INTRAMUSCULAR; INTRAVENOUS; SUBCUTANEOUS at 06:44

## 2020-11-29 RX ADMIN — Medication 1000 MILLIGRAM(S): at 12:37

## 2020-11-29 RX ADMIN — PIPERACILLIN AND TAZOBACTAM 25 GRAM(S): 4; .5 INJECTION, POWDER, LYOPHILIZED, FOR SOLUTION INTRAVENOUS at 08:30

## 2020-11-29 RX ADMIN — SODIUM CHLORIDE 125 MILLILITER(S): 9 INJECTION INTRAMUSCULAR; INTRAVENOUS; SUBCUTANEOUS at 23:14

## 2020-11-29 NOTE — PROGRESS NOTE ADULT - ASSESSMENT
49yo M, with no significant PMH, transferred from [place] for further management of worsening obstruction of R kidney secondary to 2mm distal ureteral calculus.

## 2020-11-29 NOTE — H&P ADULT - NSHPREVIEWOFSYSTEMS_GEN_ALL_CORE
CONSTITUTIONAL: denies fever, chills, fatigue, weakness  HEENT: denies blurred vision, sore throat  SKIN: denies new lesions, rash  CARDIOVASCULAR: denies chest pain, chest pressure, palpitations  RESPIRATORY: denies shortness of breath, sputum production  GASTROINTESTINAL: denies nausea, vomiting, diarrhea, abdominal pain  GENITOURINARY: denies dysuria, discharge  NEUROLOGICAL: denies numbness, headache, focal weakness  MUSCULOSKELETAL: denies new joint pain, muscle aches  HEMATOLOGIC: denies gross bleeding, bruising  LYMPHATICS: denies enlarged lymph nodes, extremity swelling  PSYCHIATRIC: denies recent changes in anxiety, depression  ENDOCRINOLOGIC: denies sweating, cold or heat intolerance CONSTITUTIONAL: denies fever, chills, fatigue, weakness  HEENT: denies blurred vision, sore throat  SKIN: denies new lesions, rash  CARDIOVASCULAR: denies chest pain, chest pressure, palpitations  RESPIRATORY: denies shortness of breath, sputum production  GASTROINTESTINAL: admits abd pain and constipation; denies nausea, vomiting, diarrhea  GENITOURINARY: denies dysuria, discharge, hematuria  NEUROLOGICAL: denies numbness, headache, focal weakness  MUSCULOSKELETAL: denies new joint pain, muscle aches  HEMATOLOGIC: denies gross bleeding, bruising CONSTITUTIONAL: denies fever, chills, fatigue, weakness  HEENT: denies blurred vision, eye pain, sinus pain, sore throat  SKIN: denies new lesions, rash  CARDIOVASCULAR: denies chest pain, chest pressure, palpitations  RESPIRATORY: denies shortness of breath, sputum production  GASTROINTESTINAL: admits abd pain and constipation; denies nausea, vomiting, diarrhea  GENITOURINARY: denies dysuria, discharge, hematuria  NEUROLOGICAL: denies numbness, headache, focal weakness  MUSCULOSKELETAL: denies new joint pain, muscle aches  HEMATOLOGIC: denies gross bleeding, bruising    Positives and pertinent negatives noted and all other systems negative.

## 2020-11-29 NOTE — H&P ADULT - NSHPSOCIALHISTORY_GEN_ALL_CORE
Lives at home with wife and child  Denies tobacco, EtOH, and rec drug use  Ambulates and performs ADLs independently  Declines flu vaccine

## 2020-11-29 NOTE — ED ADULT NURSE NOTE - NS ED NOTE ABUSE SUSPICION NEGLECT YN
[FreeTextEntry1] : starting Rachel personal.  Met with pt to provide education on how to put the Rachel on.  He was able to do it after demonstration.
No

## 2020-11-29 NOTE — PROGRESS NOTE ADULT - PROBLEM SELECTOR PLAN 5
- Pt reports not passing a regular BM since 11/24, prior to even taking Percocet  - Was given mag citrate in [facility] ED with passage of liquid stool and pebble-like stools  - Senna started at bedtime, escalate bowel regimen as needed with continued opiate administration

## 2020-11-29 NOTE — H&P ADULT - PROBLEM SELECTOR PLAN 5
- Pharm VTE ppx with lovenox 40mg sq daily    IMPROVE VTE Individual Risk Assessment          RISK                                                          Points  [  ] Previous VTE                                                3  [  ] Thrombophilia                                             2  [  ] Lower limb paralysis                                   2        (unable to hold up >15 seconds)    [  ] Current Cancer                                             2         (within 6 months)  [  ] Immobilization > 24 hrs                              1  [  ] ICU/CCU stay > 24 hours                             1  [  ] Age > 60                                                         1    IMPROVE VTE Score: 0 - Pt reports not passing a regular BM since 11/24, prior to even taking Percocet  - Was given mag citrate in [facility] ED with passage of liquid stool and pebble-like stools  - Senna started at bedtime, escalate bowel regimen as needed with continued opiate administration

## 2020-11-29 NOTE — H&P ADULT - PROBLEM SELECTOR PLAN 4
- Pt has become acutely and moderately hypo-osmolar hyponatremia, Na 124 (previously 140 on 11/24), though he is not currently symptomatic  - Likely 2/2 acute obstructive renal failure  - S/p 2L NS in the ED, continue with NS hydration cautiously so as to not increase serum Na by more than 6-8mEq over the next 24hrs  - Monitor BMP q2h and then daily - Pt has become acutely and moderately hypo-osmolar hyponatremia, Na 124 (previously 140 on 11/24), though he is not currently symptomatic  - Likely 2/2 acute obstructive renal failure with possible overconsumption of water at home  - S/p 2L NS in the ED, continue with NS hydration cautiously so as to not increase serum Na by more than 6-8mEq over the next 24hrs  - Monitor BMP q2h and then daily - Pt has become acutely and moderately hypo-osmolar hyponatremia, Na 124 (previously 140 on 11/24), though he is not currently symptomatic  - Likely 2/2 acute obstructive renal failure with possible overconsumption of water at home  - S/p 2L NS in the ED, continue with NS hydration cautiously so as to not increase serum Na by more than 6-8mEq over the next 24hrs  - Recheck BMP now and then daily

## 2020-11-29 NOTE — H&P ADULT - ASSESSMENT
49yo M, with no significant PMH, transferred from [place] for further management of worsening obstruction of R kidney secondary to 2mm distal ureteral calculus. 49yo M, with no significant PMH, transferred from [place] for further management of worsening obstruction of R kidney secondary to 2mm distal ureteral calculus.

## 2020-11-29 NOTE — H&P ADULT - PROBLEM SELECTOR PLAN 6
- Chronic, stable  - Takes Lisinopril 2.5mg qd at home, will hold at this time given KENNA and NPO status  - Monitor routine hemodynamics

## 2020-11-29 NOTE — ED ADULT NURSE NOTE - NSIMPLEMENTINTERV_GEN_ALL_ED
Back Pain
Implemented All Universal Safety Interventions:  Ona to call system. Call bell, personal items and telephone within reach. Instruct patient to call for assistance. Room bathroom lighting operational. Non-slip footwear when patient is off stretcher. Physically safe environment: no spills, clutter or unnecessary equipment. Stretcher in lowest position, wheels locked, appropriate side rails in place.

## 2020-11-29 NOTE — PROGRESS NOTE ADULT - PROBLEM SELECTOR PLAN 1
poa    less suspicion sec to obstructives uropathy - Pt afebrile though leukocytosis and tachycardia present with suspected renal source  - S/p 2L NS in the ED, iv fluid 125 cc/ hr   - F/u lactate wnl  and blood Cx's  pending result,   urine Cx collected at [facility] ED with <10K normal jazz  - Monitor temperature curve and daily CBC

## 2020-11-29 NOTE — PROGRESS NOTE ADULT - PROBLEM SELECTOR PLAN 2
kenna sec to - 2mm distal R ureter stone present since 11/24 though now with worsening renal obstruction  - Patient was started on Flomax 0.4mg daily on initial ED presentation, will hold while NPO for now  - Continue IV hydration  - Pain control with IV Tylenol 1000mg PRN q6h for moderate pain while NPO and Dilaudid 1mg q6h PRN for severe pain; avoid NSAIDs given KENNA  - Uro Dr. Sy consulted, will evaluate patient

## 2020-11-29 NOTE — PROGRESS NOTE ADULT - SUBJECTIVE AND OBJECTIVE BOX
Patient is a 50y old  Male who presents with a chief complaint of obstructive R ureteral stone (29 Nov 2020 07:31)      HPI:  51yo M, with no significant PMH, transferred from [place] complaining of flank pain. Patient originally presented to  ED on 11/24 c/o L lower back pain and the urge to urinate though not being able to. CT renal stone hunt was performed and showed 2mm distal R ureteral stone with mild hydroureteronephrosis. At that ED visit, pain improved after Toradol 15mg x1, and patient was discharged on Percocet, Flomax, and Ceftin with recommended outpatient urology follow up with Dr. Lund. Today patient presented again to the  ED complaining of same pain, which improved with his prescribed Percocet, however he had finished the 3d supply. Additionally, patient reports feeling bloated, having not passed a bowl movement in 4 days. Repeat CT stone hunt performed today and demonstrated worsening obstruction of R kidney 2/2 2mm stone with extensive perinephric free fluid. Case discussed with uro Dr. Sy who recommended keeping patient NPO though no current plans for emergent surgical intervention at this time. Patient endorses abd pain R > L; denies fever, chills, chest pain, SOB.    CT renal stone hunt 11/29: Stable location of 2mm distal R ureteral stone with worsening R hydronephrosis and free perinephric fluid suggestive of forniceal rupture  admitted with rt flank  pain found to have rt obstructive uropathy sec to rt distal   ureteral 2mm stone   pt seen and examine state pain still + rt flank pain , no fever , npo , no distress .       INTERVAL HPI/OVERNIGHT EVENTS:  T(C): 37.4 (11-29-20 @ 11:47), Max: 37.4 (11-29-20 @ 11:47)  HR: 101 (11-29-20 @ 11:47) (101 - 112)  BP: 131/71 (11-29-20 @ 11:47) (131/71 - 141/81)  RR: 18 (11-29-20 @ 11:47) (15 - 18)  SpO2: 99% (11-29-20 @ 11:47) (96% - 99%)  Wt(kg): --  I&O's Summary      REVIEW OF SYSTEMS:  CONSTITUTIONAL: No fever, weight loss, or fatigue  EYES: No eye pain, visual disturbances, or discharge  ENMT:   No sinus or throat pain  NECK: No pain or stiffness  RESPIRATORY: No cough, wheezing, chills    No shortness of breath  CARDIOVASCULAR: No chest pain, palpitations, dizziness, or leg swelling  GASTROINTESTINAL: No abdominal or epigastric pain. No nausea, vomiting, or hematemesis;   No diarrhea or constipation. No melena or hematochezia.  GENITOURINARY: No dysuria, frequency, hematuria, or incontinence  NEUROLOGICAL: No headaches, memory loss, loss of strength, numbness, or tremors  SKIN: No itching, burning, rashes, or lesions   MUSCULOSKELETAL: No joint pain or swelling; No muscle, back, or extremity pain      PHYSICAL EXAM:  GENERAL: NAD, well-groomed, well-developed  HEAD:  Atraumatic, Normocephalic  EYES: EOMI, PERRLA, conjunctiva and sclera clear  ENMT:  Moist mucous membranes,  NECK: Supple, No JVD,   NERVOUS SYSTEM:  Alert & Oriented X3, Good concentration; Motor Strength 5/5 B/L upper and lower extremities; DTRs 2+ intact and symmetric  CHEST/LUNG:  percussion bilaterally; No rales, rhonchi, wheezing,   HEART: Regular rate and rhythm; No murmurs,   ABDOMEN: Soft, Nontender, Nondistended; Bowel sounds present , rt flank  mild tenderness +  EXTREMITIES:  2+ Peripheral Pulses, No clubbing, cyanosis, or edema  SKIN: No rashes or lesions    MEDICATIONS  (STANDING):  enoxaparin Injectable 40 milliGRAM(s) SubCutaneous daily  influenza   Vaccine 0.5 milliLiter(s) IntraMuscular once  piperacillin/tazobactam IVPB.. 3.375 Gram(s) IV Intermittent every 8 hours  senna 2 Tablet(s) Oral at bedtime  sodium chloride 0.9%. 1000 milliLiter(s) (125 mL/Hr) IV Continuous <Continuous>    MEDICATIONS  (PRN):  HYDROmorphone  Injectable 1 milliGRAM(s) IV Push every 4 hours PRN Severe Pain (7 - 10)  ondansetron    Tablet 4 milliGRAM(s) Oral every 6 hours PRN Nausea      LABS:                        12.3   20.13 )-----------( 283      ( 29 Nov 2020 08:18 )             35.2     11-29    136  |  102  |  13  ----------------------------<  102<H>  4.1   |  27  |  1.70<H>    Ca    8.5      29 Nov 2020 08:18    TPro  6.9  /  Alb  2.6<L>  /  TBili  2.3<H>  /  DBili  x   /  AST  15  /  ALT  33  /  AlkPhos  162<H>  11-29                      RADIOLOGY & ADDITIONAL TESTS:    Imaging Personally Reviewed:   no new test     Advance Directives:  full code

## 2020-11-29 NOTE — CONSULT NOTE ADULT - SUBJECTIVE AND OBJECTIVE BOX
CHIEF COMPLAINT:    HPI:  49yo M, with no significant PMH, transferred from [place] complaining of flank pain. Patient originally presented to  ED on 11/24 c/o R lower back pain and the urge to urinate. CT AP w/o contrast showed 2mm distal R ureteral stone with mild hydroureteronephrosis. At that ED visit, pain improved after Toradol 15mg x1, and patient was discharged on Percocet, Flomax, and Ceftin with recommended outpatient urology follow up with Dr. Lund. Today patient presented again to the  ED complaining of same pain, which improved with his prescribed Percocet, however he had finished the 3d supply. Additionally, patient reports feeling bloated, having not passed a bowl movement in 4 days. Repeat CT stone hunt performed today and demonstrated worsening obstruction of R kidney 2/2 2mm stone with extensive perinephric free fluid. Pt denies fever, chills, chest pain, SOB. UCX neg from 11/24    In the [place] ED  VS: T 98.1  /95 RR 20 SpO2 98% on RA  Significant labs include WBC 23.88, PMNs 85.5%, Na 124, Cl 89, Cr 1.84.  UA: positive protein and blood, few bacteria  Patient received 2L NS, Toradol 15mg x1, Dilaudid 0.5mg x1, mag citrate, and Zosyn x1. (29 Nov 2020 05:27)      PAST MEDICAL & SURGICAL HISTORY:  Kidney stone    H/O laminectomy        REVIEW OF SYSTEMS:    CONSTITUTIONAL: No weakness, fevers or chills  EYES/ENT: No visual changes;  No vertigo or throat pain   NECK: No pain or stiffness  RESPIRATORY: No cough, wheezing, hemoptysis; No shortness of breath  CARDIOVASCULAR: No chest pain or palpitations  GASTROINTESTINAL: No vomiting, or hematemesis; No diarrhea or constipation. No melena or hematochezia.  GENITOURINARY: No dysuria or hematuria  NEUROLOGICAL: No numbness or weakness  SKIN: No itching, burning, rashes, or lesions   All other review of systems is negative unless indicated above.    MEDICATIONS  (STANDING):  enoxaparin Injectable 40 milliGRAM(s) SubCutaneous daily  piperacillin/tazobactam IVPB.. 3.375 Gram(s) IV Intermittent every 8 hours  senna 2 Tablet(s) Oral at bedtime  sodium chloride 0.9%. 1000 milliLiter(s) (75 mL/Hr) IV Continuous <Continuous>    MEDICATIONS  (PRN):  acetaminophen  IVPB .. 1000 milliGRAM(s) IV Intermittent every 6 hours PRN Moderate Pain (4 - 6)  HYDROmorphone  Injectable 0.5 milliGRAM(s) IV Push every 4 hours PRN Severe Pain (7 - 10)      Allergies    No Known Allergies    Intolerances        Social History:  Alcohol: Denied  Smoking: Nonsmoker  Drug Use: Denied  Marital Status:     FAMILY HISTORY:  No pertinent family history in first degree relatives        Vital Signs Last 24 Hrs  T(C): 36.1 (29 Nov 2020 04:37), Max: 36.1 (29 Nov 2020 04:37)  T(F): 96.9 (29 Nov 2020 04:37), Max: 96.9 (29 Nov 2020 04:37)  HR: 112 (29 Nov 2020 04:37) (112 - 112)  BP: 141/81 (29 Nov 2020 04:37) (141/81 - 141/81)  BP(mean): --  RR: 15 (29 Nov 2020 04:37) (15 - 15)  SpO2: 96% (29 Nov 2020 04:37) (96% - 96%)    PHYSICAL EXAM:    Constitutional: NAD, well-developed  HEENT: FARZANA, EOMI, Normal Hearing  Neck: No LAD  Back: Normal spine flexure, R CVA tenderness  Respiratory: nl resp effort  Abd: BS+, soft, NT/ND  : Normal phallus,open meatus,bilateral descended testes, no masses  Extremities: No peripheral edema  Neurological: A/O x 3, no focal deficits  Psychiatric: Normal mood, normal affect    LABS:              Urine Culture:   Blood Cultures      RADIOLOGY & ADDITIONAL STUDIES:

## 2020-11-29 NOTE — H&P ADULT - PROBLEM SELECTOR PLAN 2
- 2mm distal R ureter stone present since 11/24 though now with worsening renal obstruction  - Patient was started on Flomax 0.4mg daily on initial ED presentation, continue while inpatient  - Continue IV hydration  - Pain control with Toradol 15mg q6h PRN for mild pain, Toradol 30mg q6h PRN for moderate pain, and Dilaudid 0.5mg q4h PRN for severe pain  - Uro Dr. Sy consulted, will evaluate patient, recommending keeping patient NPO at this time - 2mm distal R ureter stone present since 11/24 though now with worsening renal obstruction  - Patient was started on Flomax 0.4mg daily on initial ED presentation, continue while inpatient  - Continue IV hydration  - Pain control with Tylenol 650mg q6h for mild pain, Tylenol 1000mg for moderate pain, and Dilaudid 0.5mg q4h PRN for severe pain  - Uro Dr. Sy consulted, will evaluate patient, recommending keeping patient NPO at this time - 2mm distal R ureter stone present since 11/24 though now with worsening renal obstruction  - Patient was started on Flomax 0.4mg daily on initial ED presentation, continue while inpatient  - Continue IV hydration  - Pain control with Tylenol 650mg q6h for mild pain, Tylenol 1000mg for moderate pain, and Dilaudid 0.5mg q4h PRN for severe pain; avoid NSAIDs given KENNA  - Uro Dr. Sy consulted, will evaluate patient, recommending keeping patient NPO at this time - 2mm distal R ureter stone present since 11/24 though now with worsening renal obstruction  - Patient was started on Flomax 0.4mg daily on initial ED presentation, will hold while NPO for now  - Continue IV hydration  - Pain control with IV Tylenol 1000mg for moderate pain while NPO and Dilaudid 0.5mg q4h PRN for severe pain; avoid NSAIDs given KENNA  - Uro Dr. Sy consulted, will evaluate patient - 2mm distal R ureter stone present since 11/24 though now with worsening renal obstruction  - Patient was started on Flomax 0.4mg daily on initial ED presentation, will hold while NPO for now  - Continue IV hydration  - Pain control with IV Tylenol 1000mg PRN q6h for moderate pain while NPO and Dilaudid 0.5mg q4h PRN for severe pain; avoid NSAIDs given KENNA  - Uro Dr. Sy consulted, will evaluate patient

## 2020-11-29 NOTE — ED PROVIDER NOTE - OBJECTIVE STATEMENT
50 y.o. M c/o renal colic, was seen here 4d ago for same, dxd with right sided 2mm distal ureteral stone, dc on ceftin/flomax/percocet/zofran, today returns with the same pain, has been taking meds as prescribed, the percocet was helping when he had it, finished the 3d supply, hasn't taken anything else for pain, also has not had a BM in 4 days and feels distended, has not tried to take anything for this  CT worsening r-hydro with forniceal rupture ,  leukocytosis and increased creatine 1.8 Patient admitted to  for admission

## 2020-11-29 NOTE — ED PROVIDER NOTE - OBJECTIVE STATEMENT
50 y.o. M c/o renal colic, was seen here 4d ago for same, dxd with right sided 2mm distal ureteral stone, dc on ceftin/flomax/percocet/zofran, today returns with the same pain 50 y.o. M c/o renal colic, was seen here 4d ago for same, dxd with right sided 2mm distal ureteral stone, dc on ceftin/flomax/percocet/zofran, today returns with the same pain, has been taking meds as prescribed, the percocet was helping when he had it, finished the 3d supply, hasn't taken anything else for pain, also has not had a BM in 4 days and feels distended, has not tried to take anything for this

## 2020-11-29 NOTE — H&P ADULT - HISTORY OF PRESENT ILLNESS
49yo M, with no significant PMH, transferred from [place] complaining of flank pain. Patient originally presented to  ED on 11/24 c/o L lower back pain and the urge to urinate though not being able to. CT renal stone hunt was performed and showed 2mm distal R ureteral stone with mild hydroureteronephrosis. At that ED visit, pain improved after Toradol 15mg x1, and patient was discharged on Percocet, Flomax, and Ceftin with recommended outpatient urology follow up with Dr. Lund. Today patient presented again to the  ED complaining of same pain, which improved with his prescribed Percocet, however he had finished the 3d supply. Additionally, patient reports feeling bloated, having not passed a bowl movement in 4 days. Repeat CT stone hunt performed today and demonstrated worsening obstruction of R kidney 2/2 2mm stone with extensive perinephric free fluid. Case discussed with uro Dr. Sy who recommended keeping patient NPO though no current plans for emergent surgical intervention at this time.    In the [place] ED  VS: T 98.1  /95 RR 20 SpO2 98% on RA  Significant labs include WBC 23.88, PMNs 85.5%, Na 124, Cl 89, Cr 1.84.  UA: positive protein and blood, few bacteria  UCX: <10K normal urogenital jazz  CT renal stone hunt 11/29: Stable location of 2mm distal R ureteral stone with worsening R hydronephrosis and free perinephric fluid suggestive of forniceal rupture  EKG:    Patient received 2L NS, Toradol 15mg x1, Dilaudid 0.5mg x1, mag citrate, and Zosyn x1. 49yo M, with no significant PMH, transferred from [place] complaining of flank pain. Patient originally presented to  ED on 11/24 c/o L lower back pain and the urge to urinate though not being able to. CT renal stone hunt was performed and showed 2mm distal R ureteral stone with mild hydroureteronephrosis. At that ED visit, pain improved after Toradol 15mg x1, and patient was discharged on Percocet, Flomax, and Ceftin with recommended outpatient urology follow up with Dr. Lund. Today patient presented again to the  ED complaining of same pain, which improved with his prescribed Percocet, however he had finished the 3d supply. Additionally, patient reports feeling bloated, having not passed a bowl movement in 4 days. Repeat CT stone hunt performed today and demonstrated worsening obstruction of R kidney 2/2 2mm stone with extensive perinephric free fluid. Case discussed with uro Dr. Sy who recommended keeping patient NPO though no current plans for emergent surgical intervention at this time. Patient endorses abd pain R > L; denies fever, chills, chest pain, SOB.    In the [place] ED  VS: T 98.1  /95 RR 20 SpO2 98% on RA  Significant labs include WBC 23.88, PMNs 85.5%, Na 124, Cl 89, Cr 1.84.  UA: positive protein and blood, few bacteria  UCX: <10K normal urogenital jazz  CT renal stone hunt 11/29: Stable location of 2mm distal R ureteral stone with worsening R hydronephrosis and free perinephric fluid suggestive of forniceal rupture  EKG: pending    Patient received 2L NS, Toradol 15mg x1, Dilaudid 0.5mg x1, mag citrate, and Zosyn x1.

## 2020-11-29 NOTE — H&P ADULT - PROBLEM SELECTOR PLAN 1
- Pt afebrile though leukocytosis and tachycardia present with suspected renal source  - Admit to [facility]  - S/p 2L NS in the ED, continue IVF @ 75cc/hr while patient is kept NPO  - F/u lactate and blood Cx's; urine Cx collected at [facility] ED with <10K normal jazz  - S/p Zosyn in the ED, will continue upon admission  - Monitor temperature curve and daily CBC - Pt afebrile though leukocytosis and tachycardia present with suspected renal source  - Admit to [facility]  - S/p 2L NS in the ED, continue IVF @ 75cc/hr while patient is kept NPO  - F/u lactate and blood Cx's though patient already received dose of Zosyn in [facility] ED; urine Cx collected at [facility] ED with <10K normal jazz  - S/p Zosyn in the ED, will continue upon admission  - Monitor temperature curve and daily CBC

## 2020-11-29 NOTE — H&P ADULT - NSHPPHYSICALEXAM_GEN_ALL_CORE
T(C): 36.1 (11-29-20 @ 04:37), Max: 36.1 (11-29-20 @ 04:37)  HR: 112 (11-29-20 @ 04:37) (112 - 112)  BP: 141/81 (11-29-20 @ 04:37) (141/81 - 141/81)  RR: 15 (11-29-20 @ 04:37) (15 - 15)  SpO2: 96% (11-29-20 @ 04:37) (96% - 96%)    GENERAL: patient appears well, no acute distress, appropriate, pleasant  EYES: sclera clear, no exudates  ENMT: oropharynx clear without erythema, no exudates, moist mucous membranes  NECK: supple, soft, no thyromegaly noted  LUNGS: good air entry bilaterally, clear to auscultation, symmetric breath sounds, no wheezing or rhonchi appreciated  HEART: soft S1/S2, regular rate and rhythm, no murmurs noted, no lower extremity edema  GASTROINTESTINAL: abdomen is soft, nontender, nondistended, normoactive bowel sounds, no palpable masses  INTEGUMENT: good skin turgor, no lesions noted  MUSCULOSKELETAL: no clubbing or cyanosis, no obvious deformity  NEUROLOGIC: awake, alert, oriented x3, good muscle tone in 4 extremities, no obvious sensory deficits  PSYCHIATRIC: mood is good, affect is congruent, linear and logical thought process  HEME/LYMPH: no palpable supraclavicular nodules, no obvious ecchymosis or petechiae T(C): 36.1 (11-29-20 @ 04:37), Max: 36.1 (11-29-20 @ 04:37)  HR: 112 (11-29-20 @ 04:37) (112 - 112)  BP: 141/81 (11-29-20 @ 04:37) (141/81 - 141/81)  RR: 15 (11-29-20 @ 04:37) (15 - 15)  SpO2: 96% (11-29-20 @ 04:37) (96% - 96%)    GENERAL: patient appears uncomfortable in stretcher  EYES: sclera clear, no exudates  ENMT: moist mucous membranes  NECK: supple, soft, no thyromegaly noted  LUNGS: good air entry bilaterally, clear to auscultation, symmetric breath sounds, no wheezing or rhonchi appreciated  HEART: soft S1/S2, tachycardic, regular rhythm, no murmurs noted, no lower extremity edema  GASTROINTESTINAL: abdomen is firm, distended, diffusely tender R>L, hypoactive BS x4, no palpable masses  INTEGUMENT: good skin turgor, no lesions noted  MUSCULOSKELETAL: no clubbing or cyanosis, no obvious deformity  NEUROLOGIC: awake, alert, oriented x3, good muscle tone in 4 extremities, no obvious sensory deficits  PSYCHIATRIC: mood is good, affect is congruent, linear and logical thought process  HEME/LYMPH: no palpable supraclavicular nodules, no obvious ecchymosis or petechiae T(C): 36.1 (11-29-20 @ 04:37), Max: 36.1 (11-29-20 @ 04:37)  HR: 112 (11-29-20 @ 04:37) (112 - 112)  BP: 141/81 (11-29-20 @ 04:37) (141/81 - 141/81)  RR: 15 (11-29-20 @ 04:37) (15 - 15)  SpO2: 96% (11-29-20 @ 04:37) (96% - 96%)    Constitutional: patient appears uncomfortable in stretcher  Ears, Nose, Mouth, and Throat: normal external ears and nose, normal hearing, moist oral mucosa  Eyes: normal conjunctiva, EOMI, PERRL  Neck: supple, no JVD  Respiratory: Clear to auscultation bilaterally. No wheezes, rales or rhonchi. Normal respiratory effort  Cardiovascular: soft S1/S2, tachycardic, regular rhythm, no murmurs noted, no lower extremity edema  Gastrointestinal: abdomen is firm, distended, diffusely tender R>L, hypoactive BS x4, no palpable masses  Skin: warm, dry, no rash  Neurologic: sensation grossly intact, CN grossly intact, non-focal exam  Musculoskeletal: no clubbing, no cyanosis, no joint swelling  Psychiatric: AOX3, appropriate mood, affect

## 2020-11-29 NOTE — ED PROVIDER NOTE - SIGNIFICANT NEGATIVE FINDINGS
no headache, no chest pain, no SOB, no palpitations , no no fever, no COVID , no bleeding. no neuro changes.

## 2020-11-29 NOTE — ED CLERICAL - CLERICAL COMMENTS
called EMS @ [phone] for transport to [place] called EMS @ [phone] for transport to [place] / EMS arrived @ [phone]

## 2020-11-29 NOTE — ED PROVIDER NOTE - CLINICAL SUMMARY MEDICAL DECISION MAKING FREE TEXT BOX
worsening right hydro, elevated WBC, no fever  and increased creatine, admit to medicine with urology consult

## 2020-11-29 NOTE — ED PROVIDER NOTE - CLINICAL SUMMARY MEDICAL DECISION MAKING FREE TEXT BOX
rt renal stone dxd 4d ago, now increased pain and also constipation from opiates - iv, labs, ua/cx, reimage given distention

## 2020-11-29 NOTE — PROGRESS NOTE ADULT - PROBLEM SELECTOR PLAN 7
- Pharm VTE ppx with lovenox 40mg sq daily    IMPROVE VTE Individual Risk Assessment          RISK                                                          Points  [  ] Previous VTE                                                3  [  ] Thrombophilia                                             2  [  ] Lower limb paralysis                                   2        (unable to hold up >15 seconds)    [  ] Current Cancer                                             2         (within 6 months)  [  ] Immobilization > 24 hrs                              1  [  ] ICU/CCU stay > 24 hours                             1  [  ] Age > 60                                                         1    IMPROVE VTE Score: 0

## 2020-11-29 NOTE — ED ADULT NURSE NOTE - OBJECTIVE STATEMENT
patient a/o x 4 with a calm affect transferred from [place] for renal colic.  patient previously diagnosed with 2mm kidney stone, and returned to ED after pain medication ran out because of the pain.  patient pending initial admit eval, and bed placement.

## 2020-11-29 NOTE — ED PROVIDER NOTE - PROGRESS NOTE DETAILS
discussed with Dr. Sy, will need to transfer to [place] for further care, keep NPO, no emergent intervention at this time discussed with Dr. Sy, will need to transfer to [place] for further care, keep NPO, no emergent intervention at this time; accepted by hospitalist Dr. Camacho at [place] for admission; pt had BM, still in a lot of pain, giving small dose dilaudid for pain accepted by ED Dr. Buitrago for transfer

## 2020-11-29 NOTE — H&P ADULT - PROBLEM SELECTOR PLAN 3
- Cr 1.84, baseline ~1.1  - Likely due to obstructive etiology  - Continue with IV fluid hydration and renal calculus management  - Avoid nephrotoxic medications and monitor daily renal indices

## 2020-11-29 NOTE — PROGRESS NOTE ADULT - PROBLEM SELECTOR PLAN 4
- Pt has become acutely and moderately hypo-osmolar hyponatremia, Na 124 a symptomatic  - Likely 2/2 acute obstructive renal failure with possible overconsumption of water at home  resolved repeat sodium 136

## 2020-11-29 NOTE — CHART NOTE - NSCHARTNOTEFT_GEN_A_CORE
Called by RN for pt with tachycardia.  and /96. Patient asymptomatic.       T(C): 37.8 (11-29-20 @ 21:23), Max: 37.8 (11-29-20 @ 21:23)  HR: 110 (11-29-20 @ 21:23) (101 - 112)  BP: 149/96 (11-29-20 @ 21:23) (117/79 - 149/96)  RR: 18 (11-29-20 @ 21:23) (15 - 18)  SpO2: 91% (11-29-20 @ 21:23) (91% - 99%)  Wt(kg): --          Assessment/Plan  49yo M, with no significant PMH, transferred from [place] for further management of worsening obstruction of R kidney secondary to 2mm distal ureteral calculus.    - patient tachycardic and now also hypertensive, likely in setting of pain from nephrolithiasis.   - will give lopressor 25mg po for tachycardia   - continue to monitor  - RN to notify of any changes

## 2020-11-29 NOTE — CONSULT NOTE ADULT - ASSESSMENT
51yo male w/ RRC, R hydro, possible forniceal rupture 2n2 2mm R UVJ calculus. Afeb, comfortable. Neg UA/Ucx.      Fluids  Strain urine  Analgesics prn  Reg diet, NPO p MN  If clinically deteriorates, will sched c/stent/?URS  Repeat CBC/Chem7 in am

## 2020-11-29 NOTE — ED PROVIDER NOTE - CARE PLAN
Principal Discharge DX:	Kidney stone on right side  Secondary Diagnosis:	Abnormal urologic system diagnostic imaging  Secondary Diagnosis:	Hyponatremia  Secondary Diagnosis:	Acute kidney injury

## 2020-11-30 ENCOUNTER — RESULT REVIEW (OUTPATIENT)
Age: 50
End: 2020-11-30

## 2020-11-30 LAB
ALBUMIN SERPL ELPH-MCNC: 2.3 G/DL — LOW (ref 3.3–5)
ALP SERPL-CCNC: 194 U/L — HIGH (ref 40–120)
ALT FLD-CCNC: 30 U/L — SIGNIFICANT CHANGE UP (ref 12–78)
ANION GAP SERPL CALC-SCNC: 6 MMOL/L — SIGNIFICANT CHANGE UP (ref 5–17)
ANION GAP SERPL CALC-SCNC: 6 MMOL/L — SIGNIFICANT CHANGE UP (ref 5–17)
AST SERPL-CCNC: 13 U/L — LOW (ref 15–37)
BASOPHILS # BLD AUTO: 0.02 K/UL — SIGNIFICANT CHANGE UP (ref 0–0.2)
BASOPHILS NFR BLD AUTO: 0.1 % — SIGNIFICANT CHANGE UP (ref 0–2)
BILIRUB SERPL-MCNC: 0.8 MG/DL — SIGNIFICANT CHANGE UP (ref 0.2–1.2)
BUN SERPL-MCNC: 14 MG/DL — SIGNIFICANT CHANGE UP (ref 7–23)
BUN SERPL-MCNC: 15 MG/DL — SIGNIFICANT CHANGE UP (ref 7–23)
CALCIUM SERPL-MCNC: 8.2 MG/DL — LOW (ref 8.5–10.1)
CALCIUM SERPL-MCNC: 8.7 MG/DL — SIGNIFICANT CHANGE UP (ref 8.5–10.1)
CHLORIDE SERPL-SCNC: 105 MMOL/L — SIGNIFICANT CHANGE UP (ref 96–108)
CHLORIDE SERPL-SCNC: 106 MMOL/L — SIGNIFICANT CHANGE UP (ref 96–108)
CK MB BLD-MCNC: 1.2 % — SIGNIFICANT CHANGE UP (ref 0–3.5)
CK MB CFR SERPL CALC: 1 NG/ML — SIGNIFICANT CHANGE UP (ref 0–3.6)
CK SERPL-CCNC: 80 U/L — SIGNIFICANT CHANGE UP (ref 26–308)
CO2 SERPL-SCNC: 28 MMOL/L — SIGNIFICANT CHANGE UP (ref 22–31)
CO2 SERPL-SCNC: 29 MMOL/L — SIGNIFICANT CHANGE UP (ref 22–31)
CREAT SERPL-MCNC: 1.3 MG/DL — SIGNIFICANT CHANGE UP (ref 0.5–1.3)
CREAT SERPL-MCNC: 1.5 MG/DL — HIGH (ref 0.5–1.3)
D DIMER BLD IA.RAPID-MCNC: 1950 NG/ML DDU — HIGH
EOSINOPHIL # BLD AUTO: 0.21 K/UL — SIGNIFICANT CHANGE UP (ref 0–0.5)
EOSINOPHIL NFR BLD AUTO: 1.4 % — SIGNIFICANT CHANGE UP (ref 0–6)
GLUCOSE SERPL-MCNC: 139 MG/DL — HIGH (ref 70–99)
GLUCOSE SERPL-MCNC: 98 MG/DL — SIGNIFICANT CHANGE UP (ref 70–99)
HCT VFR BLD CALC: 32.2 % — LOW (ref 39–50)
HGB BLD-MCNC: 11 G/DL — LOW (ref 13–17)
IMM GRANULOCYTES NFR BLD AUTO: 0.4 % — SIGNIFICANT CHANGE UP (ref 0–1.5)
LYMPHOCYTES # BLD AUTO: 0.71 K/UL — LOW (ref 1–3.3)
LYMPHOCYTES # BLD AUTO: 4.9 % — LOW (ref 13–44)
MCHC RBC-ENTMCNC: 29.8 PG — SIGNIFICANT CHANGE UP (ref 27–34)
MCHC RBC-ENTMCNC: 34.2 GM/DL — SIGNIFICANT CHANGE UP (ref 32–36)
MCV RBC AUTO: 87.3 FL — SIGNIFICANT CHANGE UP (ref 80–100)
MONOCYTES # BLD AUTO: 1.37 K/UL — HIGH (ref 0–0.9)
MONOCYTES NFR BLD AUTO: 9.4 % — SIGNIFICANT CHANGE UP (ref 2–14)
NEUTROPHILS # BLD AUTO: 12.21 K/UL — HIGH (ref 1.8–7.4)
NEUTROPHILS NFR BLD AUTO: 83.8 % — HIGH (ref 43–77)
NRBC # BLD: 0 /100 WBCS — SIGNIFICANT CHANGE UP (ref 0–0)
PLATELET # BLD AUTO: 319 K/UL — SIGNIFICANT CHANGE UP (ref 150–400)
POTASSIUM SERPL-MCNC: 3.9 MMOL/L — SIGNIFICANT CHANGE UP (ref 3.5–5.3)
POTASSIUM SERPL-MCNC: 4 MMOL/L — SIGNIFICANT CHANGE UP (ref 3.5–5.3)
POTASSIUM SERPL-SCNC: 3.9 MMOL/L — SIGNIFICANT CHANGE UP (ref 3.5–5.3)
POTASSIUM SERPL-SCNC: 4 MMOL/L — SIGNIFICANT CHANGE UP (ref 3.5–5.3)
PROT SERPL-MCNC: 6.5 G/DL — SIGNIFICANT CHANGE UP (ref 6–8.3)
RBC # BLD: 3.69 M/UL — LOW (ref 4.2–5.8)
RBC # FLD: 13.2 % — SIGNIFICANT CHANGE UP (ref 10.3–14.5)
SODIUM SERPL-SCNC: 140 MMOL/L — SIGNIFICANT CHANGE UP (ref 135–145)
SODIUM SERPL-SCNC: 140 MMOL/L — SIGNIFICANT CHANGE UP (ref 135–145)
TROPONIN I SERPL-MCNC: <.015 NG/ML — SIGNIFICANT CHANGE UP (ref 0.01–0.04)
WBC # BLD: 14.58 K/UL — HIGH (ref 3.8–10.5)
WBC # FLD AUTO: 14.58 K/UL — HIGH (ref 3.8–10.5)

## 2020-11-30 PROCEDURE — 93010 ELECTROCARDIOGRAM REPORT: CPT

## 2020-11-30 PROCEDURE — 71275 CT ANGIOGRAPHY CHEST: CPT | Mod: 26

## 2020-11-30 PROCEDURE — 71045 X-RAY EXAM CHEST 1 VIEW: CPT | Mod: 26

## 2020-11-30 PROCEDURE — 88300 SURGICAL PATH GROSS: CPT | Mod: 26

## 2020-11-30 PROCEDURE — 99233 SBSQ HOSP IP/OBS HIGH 50: CPT | Mod: GC

## 2020-11-30 PROCEDURE — 93970 EXTREMITY STUDY: CPT | Mod: 26

## 2020-11-30 RX ORDER — SODIUM CHLORIDE 9 MG/ML
1000 INJECTION, SOLUTION INTRAVENOUS
Refills: 0 | Status: DISCONTINUED | OUTPATIENT
Start: 2020-11-30 | End: 2020-11-30

## 2020-11-30 RX ORDER — OXYCODONE HYDROCHLORIDE 5 MG/1
5 TABLET ORAL ONCE
Refills: 0 | Status: DISCONTINUED | OUTPATIENT
Start: 2020-11-30 | End: 2020-11-30

## 2020-11-30 RX ORDER — HYDROMORPHONE HYDROCHLORIDE 2 MG/ML
0.5 INJECTION INTRAMUSCULAR; INTRAVENOUS; SUBCUTANEOUS
Refills: 0 | Status: DISCONTINUED | OUTPATIENT
Start: 2020-11-30 | End: 2020-11-30

## 2020-11-30 RX ORDER — HYDROMORPHONE HYDROCHLORIDE 2 MG/ML
1 INJECTION INTRAMUSCULAR; INTRAVENOUS; SUBCUTANEOUS ONCE
Refills: 0 | Status: DISCONTINUED | OUTPATIENT
Start: 2020-11-30 | End: 2020-11-30

## 2020-11-30 RX ORDER — ONDANSETRON 8 MG/1
4 TABLET, FILM COATED ORAL ONCE
Refills: 0 | Status: DISCONTINUED | OUTPATIENT
Start: 2020-11-30 | End: 2020-11-30

## 2020-11-30 RX ADMIN — HYDROMORPHONE HYDROCHLORIDE 1 MILLIGRAM(S): 2 INJECTION INTRAMUSCULAR; INTRAVENOUS; SUBCUTANEOUS at 05:20

## 2020-11-30 RX ADMIN — HYDROMORPHONE HYDROCHLORIDE 1 MILLIGRAM(S): 2 INJECTION INTRAMUSCULAR; INTRAVENOUS; SUBCUTANEOUS at 05:47

## 2020-11-30 RX ADMIN — HYDROMORPHONE HYDROCHLORIDE 1 MILLIGRAM(S): 2 INJECTION INTRAMUSCULAR; INTRAVENOUS; SUBCUTANEOUS at 09:05

## 2020-11-30 RX ADMIN — PIPERACILLIN AND TAZOBACTAM 25 GRAM(S): 4; .5 INJECTION, POWDER, LYOPHILIZED, FOR SOLUTION INTRAVENOUS at 08:19

## 2020-11-30 RX ADMIN — SODIUM CHLORIDE 125 MILLILITER(S): 9 INJECTION INTRAMUSCULAR; INTRAVENOUS; SUBCUTANEOUS at 07:49

## 2020-11-30 RX ADMIN — SODIUM CHLORIDE 75 MILLILITER(S): 9 INJECTION, SOLUTION INTRAVENOUS at 15:34

## 2020-11-30 RX ADMIN — Medication 25 MILLIGRAM(S): at 01:13

## 2020-11-30 RX ADMIN — HYDROMORPHONE HYDROCHLORIDE 1 MILLIGRAM(S): 2 INJECTION INTRAMUSCULAR; INTRAVENOUS; SUBCUTANEOUS at 01:30

## 2020-11-30 RX ADMIN — HYDROMORPHONE HYDROCHLORIDE 1 MILLIGRAM(S): 2 INJECTION INTRAMUSCULAR; INTRAVENOUS; SUBCUTANEOUS at 01:13

## 2020-11-30 RX ADMIN — HYDROMORPHONE HYDROCHLORIDE 1 MILLIGRAM(S): 2 INJECTION INTRAMUSCULAR; INTRAVENOUS; SUBCUTANEOUS at 08:51

## 2020-11-30 NOTE — PROVIDER CONTACT NOTE (MEDICATION) - SITUATION
Patient complaining of Right Upper Quadrant and Back Flank Pain; Pain medication not due until 09:20

## 2020-11-30 NOTE — BRIEF OPERATIVE NOTE - NSICDXBRIEFPROCEDURE_GEN_ALL_CORE_FT
PROCEDURES:  Ureteroscopy with laser lithotripsy and stent placement 30-Nov-2020 15:19:47  Gurwinder Sy

## 2020-11-30 NOTE — PROGRESS NOTE ADULT - SUBJECTIVE AND OBJECTIVE BOX
Patient is a 50y old  Male who presents with a chief complaint of obstructive R ureteral stone (29 Nov 2020 13:02)      INTERVAL HPI/OVERNIGHT EVENTS: Patient seen and evaluated at bedside. Patient continued to have tachycardia overnight and was given lopressor 25 mg po x1. Patient complaining of 10/10 R flank pain this morning before pain medication was due, given dilaudid 1 mg IV push x1. Denies fever, chills, CP, SOB, n/v.     MEDICATIONS  (STANDING):  enoxaparin Injectable 40 milliGRAM(s) SubCutaneous daily  influenza   Vaccine 0.5 milliLiter(s) IntraMuscular once  piperacillin/tazobactam IVPB.. 3.375 Gram(s) IV Intermittent every 8 hours  senna 2 Tablet(s) Oral at bedtime  sodium chloride 0.9%. 1000 milliLiter(s) (125 mL/Hr) IV Continuous <Continuous>    MEDICATIONS  (PRN):  HYDROmorphone  Injectable 1 milliGRAM(s) IV Push every 4 hours PRN Severe Pain (7 - 10)  HYDROmorphone  Injectable 0.5 milliGRAM(s) IV Push every 6 hours PRN Moderate Pain (4 - 6)  ondansetron    Tablet 4 milliGRAM(s) Oral every 6 hours PRN Nausea      Allergies    No Known Allergies    Intolerances        REVIEW OF SYSTEMS:  CONSTITUTIONAL: No fever or chills  HEENT: No headache, no sore throat  RESPIRATORY: No cough, wheezing, or shortness of breath  CARDIOVASCULAR: No chest pain, palpitations  GASTROINTESTINAL: +abd pain, no nausea, vomiting, or diarrhea  GENITOURINARY: +flank pain, no dysuria, frequency, or hematuria  NEUROLOGICAL: No focal weakness or dizziness  MUSCULOSKELETAL: No myalgias       Vital Signs Last 24 Hrs  T(C): 37.2 (30 Nov 2020 04:42), Max: 37.8 (29 Nov 2020 21:23)  T(F): 99 (30 Nov 2020 04:42), Max: 100 (29 Nov 2020 21:23)  HR: 104 (30 Nov 2020 05:35) (99 - 118)  BP: 152/97 (30 Nov 2020 05:35) (117/79 - 164/99)  BP(mean): --  RR: 18 (30 Nov 2020 05:35) (18 - 20)  SpO2: 93% (30 Nov 2020 05:35) (93% - 99%)    PHYSICAL EXAM:  GENERAL: NAD  HEENT:  anicteric, moist mucous membranes  CHEST/LUNG:  CTA b/l, no rales, wheezes, or rhonchi  HEART:  RRR, S1, S2  ABDOMEN:  BS+, soft, +R flank and RUQ/RLQ TTP, nondistended  EXTREMITIES: no edema, cyanosis, or calf tenderness  NERVOUS SYSTEM: answers questions and follows commands appropriately    LABS:                        11.0   14.58 )-----------( 319      ( 30 Nov 2020 07:40 )             32.2     CBC Full  -  ( 30 Nov 2020 07:40 )  WBC Count : 14.58 K/uL  Hemoglobin : 11.0 g/dL  Hematocrit : 32.2 %  Platelet Count - Automated : 319 K/uL  Mean Cell Volume : 87.3 fl  Mean Cell Hemoglobin : 29.8 pg  Mean Cell Hemoglobin Concentration : 34.2 gm/dL  Auto Neutrophil # : 12.21 K/uL  Auto Lymphocyte # : 0.71 K/uL  Auto Monocyte # : 1.37 K/uL  Auto Eosinophil # : 0.21 K/uL  Auto Basophil # : 0.02 K/uL  Auto Neutrophil % : 83.8 %  Auto Lymphocyte % : 4.9 %  Auto Monocyte % : 9.4 %  Auto Eosinophil % : 1.4 %  Auto Basophil % : 0.1 %    30 Nov 2020 07:40    140    |  106    |  14     ----------------------------<  98     4.0     |  28     |  1.50     Ca    8.2        30 Nov 2020 07:40    TPro  6.5    /  Alb  2.3    /  TBili  0.8    /  DBili  x      /  AST  13     /  ALT  30     /  AlkPhos  194    30 Nov 2020 07:40        CAPILLARY BLOOD GLUCOSE              RADIOLOGY & ADDITIONAL TESTS:    Personally reviewed.     Consultant(s) Notes Reviewed:  [x] YES  [ ] NO   Patient is a 50y old  Male who presents with a chief complaint of obstructive R ureteral stone (29 Nov 2020 13:02)    admitted with rt ureteral distal stone causing  aure   INTERVAL HPI/OVERNIGHT EVENTS: Patient seen and evaluated at bedside  . Patient continued to have tachycardia overnight and was given lopressor 25 mg po x1. Patient complaining of 10/10 R flank pain this morning before pain medication was due, given dilaudid 1 mg IV push x1. Denies fever, chills, CP, SOB, n/v.           REVIEW OF SYSTEMS:  CONSTITUTIONAL: No fever or chills  HEENT: No headache, no sore throat  RESPIRATORY: No cough, wheezing, or shortness of breath  CARDIOVASCULAR: No chest pain, palpitations  GASTROINTESTINAL: +abd pain, no nausea, vomiting, or diarrhea  GENITOURINARY: +flank pain, no dysuria, frequency, or hematuria  NEUROLOGICAL: No focal weakness or dizziness  MUSCULOSKELETAL: No myalgias       Vital Signs Last 24 Hrs  T(C): 37.2 (30 Nov 2020 04:42), Max: 37.8 (29 Nov 2020 21:23)  T(F): 99 (30 Nov 2020 04:42), Max: 100 (29 Nov 2020 21:23)  HR: 104 (30 Nov 2020 05:35) (99 - 118)  BP: 152/97 (30 Nov 2020 05:35) (117/79 - 164/99)  BP(mean): --  RR: 18 (30 Nov 2020 05:35) (18 - 20)  SpO2: 93% (30 Nov 2020 05:35) (93% - 99%)    PHYSICAL EXAM:  GENERAL: NAD  HEENT:  anicteric, moist mucous membranes  CHEST/LUNG:  CTA b/l, no rales, wheezes, or rhonchi  HEART:  RRR, S1, S2, no tachy   ABDOMEN:  BS+, soft, +R flank and RUQ/RLQ TTP, nondistended  EXTREMITIES: no edema, cyanosis, or calf tenderness  NERVOUS SYSTEM: aao/3 , no focal deficit , motor intact 5/5 all 4ext   gu intact      LABS:                        11.0   14.58 )-----------( 319      ( 30 Nov 2020 07:40 )             32.2     CBC Full  -  ( 30 Nov 2020 07:40 )  WBC Count : 14.58 K/uL  Hemoglobin : 11.0 g/dL  Hematocrit : 32.2 %  Platelet Count - Automated : 319 K/uL  Mean Cell Volume : 87.3 fl  Mean Cell Hemoglobin : 29.8 pg  Mean Cell Hemoglobin Concentration : 34.2 gm/dL  Auto Neutrophil # : 12.21 K/uL  Auto Lymphocyte # : 0.71 K/uL  Auto Monocyte # : 1.37 K/uL  Auto Eosinophil # : 0.21 K/uL  Auto Basophil # : 0.02 K/uL  Auto Neutrophil % : 83.8 %  Auto Lymphocyte % : 4.9 %  Auto Monocyte % : 9.4 %  Auto Eosinophil % : 1.4 %  Auto Basophil % : 0.1 %    30 Nov 2020 07:40    140    |  106    |  14     ----------------------------<  98     4.0     |  28     |  1.50     Ca    8.2        30 Nov 2020 07:40    TPro  6.5    /  Alb  2.3    /  TBili  0.8    /  DBili  x      /  AST  13     /  ALT  30     /  AlkPhos  194    30 Nov 2020 07:40                      RADIOLOGY & ADDITIONAL TESTS:    Personally reviewed.     Consultant(s) Notes Reviewed:  [x] YES  [ ] NO

## 2020-11-30 NOTE — PROGRESS NOTE ADULT - ATTENDING COMMENTS
pt seen and examine today see above plan   -  sepsis poa less suspicion    aure      sec to obstruction of Rt kidney secondary to 2mm distal ureteral calculus   - iv fluid 125 cc/ hr , npo , Dilaudid pain meds , iv abx zosyne 3.375 gm q8hr , blood cult pending   as per uro dr gonzales  If clinically deteriorates, will sched c/stent   .
pt seen and examine today see above plan   - 50 m  sepsis poa less suspicion    aure      sec to obstruction of Rt kidney secondary to 2mm distal ureteral calculus  , leucocytosis   - iv fluid 125 cc/ hr , npo , Dilaudid pain meds , iv abx zosyne 3.375 gm q8hr , blood cult pending   uro dr gonzales   going or today medically optimize for moderate risk procedure  / hold on dvt prophy  .

## 2020-11-30 NOTE — PROGRESS NOTE ADULT - PROBLEM SELECTOR PLAN 7
- Pharm VTE ppx with lovenox 40mg sq daily, HOLD for OR with urology    IMPROVE VTE Individual Risk Assessment          RISK                                                          Points  [  ] Previous VTE                                                3  [  ] Thrombophilia                                             2  [  ] Lower limb paralysis                                   2        (unable to hold up >15 seconds)    [  ] Current Cancer                                             2         (within 6 months)  [  ] Immobilization > 24 hrs                              1  [  ] ICU/CCU stay > 24 hours                             1  [  ] Age > 60                                                         1    IMPROVE VTE Score: 0

## 2020-11-30 NOTE — PROGRESS NOTE ADULT - PROBLEM SELECTOR PLAN 3
- Cr 2.0 ->1.5, baseline ~1.1  - Likely due to obstructive etiology  - Continue with IV fluid hydration and renal calculus management  - Avoid nephrotoxic medications and monitor daily renal indices

## 2020-11-30 NOTE — CHART NOTE - NSCHARTNOTEFT_GEN_A_CORE
Called by RN for pt with SOB, palpitations, diaphoresis. Patient is non-smoker, denies alcohol use. Wells score 0. Patient denies being in pain, no pain medication standing or PRN. Patient had renal calculus removed today by IR, lovenox was held for procedure.       T(C): 37.8 (11-30-20 @ 17:57), Max: 37.8 (11-29-20 @ 21:23)  HR: 112 (11-30-20 @ 17:57) (98 - 118)  BP: 144/96 (11-30-20 @ 18:09) (139/86 - 183/90)  RR: 18 (11-30-20 @ 17:57) (17 - 25)  SpO2: 94% (11-30-20 @ 17:57) (92% - 100%)  Wt(kg): --    Physical :  Gen- NAD, diaphoretic,   Cardio - s+1,s+2, rrr, no murmur  Lung - cta b/l, no wheeze, no rhonchi, no rales   Abdomen- +BS, NT, no guarding, no rebound, no masses, hard, distended  Ext- no edema, 2+ pulses b/l, negative hommans sign       LABS:                        11.0   14.58 )-----------( 319      ( 30 Nov 2020 07:40 )             32.2     11-30    140  |  106  |  14  ----------------------------<  98  4.0   |  28  |  1.50<H>    Ca    8.2<L>      30 Nov 2020 07:40    TPro  6.5  /  Alb  2.3<L>  /  TBili  0.8  /  DBili  x   /  AST  13<L>  /  ALT  30  /  AlkPhos  194<H>  11-30                Assessment/Plan  50yMale admitted for     - stat EKG  - stat cardiac enzymes and d-dimer to rule out ACS/PE  - continue to monitor  - RN to notify of any changes Called by RN for pt with SOB, palpitations, diaphoresis. Patient is non-smoker, denies alcohol use. Wells score 0. Patient denies being in pain, no pain medication standing or PRN. Patient had renal calculus removed today by IR, lovenox was held for procedure.       T(C): 37.8 (11-30-20 @ 17:57), Max: 37.8 (11-29-20 @ 21:23)  HR: 112 (11-30-20 @ 17:57) (98 - 118)  BP: 144/96 (11-30-20 @ 18:09) (139/86 - 183/90)  RR: 18 (11-30-20 @ 17:57) (17 - 25)  SpO2: 94% (11-30-20 @ 17:57) (92% - 100%)  Wt(kg): --    Physical :  Gen- NAD, diaphoretic,   Cardio - s+1,s+2, rrr, no murmur  Lung - cta b/l, no wheeze, no rhonchi, no rales   Abdomen- +BS, NT, no guarding, no rebound, no masses, hard, distended  Ext- no edema, 2+ pulses b/l, negative hommans sign       LABS:                        11.0   14.58 )-----------( 319      ( 30 Nov 2020 07:40 )             32.2     11-30    140  |  106  |  14  ----------------------------<  98  4.0   |  28  |  1.50<H>    Ca    8.2<L>      30 Nov 2020 07:40    TPro  6.5  /  Alb  2.3<L>  /  TBili  0.8  /  DBili  x   /  AST  13<L>  /  ALT  30  /  AlkPhos  194<H>  11-30                Assessment/Plan  51yo M, with no significant PMH, transferred from [place] for further management of worsening obstruction of R kidney secondary to 2mm distal ureteral calculus.    - pt SOB, palpitations, diaphoretic. Wells score 0. s/p IR renal jocelyne removal   - stat EKG and cxr  - stat b/l dopplers  - stat cardiac enzymes and d-dimer to rule out ACS/PE  - stat BNP  - will speak with urology dr Sy before starting DVT ppx  - will consider CTA vs V/Q scan pending results of tests mentioned above  - continue to monitor  - RN to notify of any changes Called by RN for pt with SOB, palpitations, diaphoresis. Patient is non-smoker, denies alcohol use. Wells score 0. Patient denies being in pain, no pain medication standing or PRN. Patient had renal calculus removed today by IR, lovenox was held for procedure.       T(C): 37.8 (11-30-20 @ 17:57), Max: 37.8 (11-29-20 @ 21:23)  HR: 112 (11-30-20 @ 17:57) (98 - 118)  BP: 144/96 (11-30-20 @ 18:09) (139/86 - 183/90)  RR: 18 (11-30-20 @ 17:57) (17 - 25)  SpO2: 94% (11-30-20 @ 17:57) (92% - 100%)  Wt(kg): --    Physical :  Gen- NAD, diaphoretic,   Cardio - s+1,s+2, rrr, no murmur  Lung - cta b/l, no wheeze, no rhonchi, no rales   Abdomen- +BS, NT, no guarding, no rebound, no masses, hard, distended  Ext- no edema, 2+ pulses b/l, negative hommans sign       LABS:                        11.0   14.58 )-----------( 319      ( 30 Nov 2020 07:40 )             32.2     11-30    140  |  106  |  14  ----------------------------<  98  4.0   |  28  |  1.50<H>    Ca    8.2<L>      30 Nov 2020 07:40    TPro  6.5  /  Alb  2.3<L>  /  TBili  0.8  /  DBili  x   /  AST  13<L>  /  ALT  30  /  AlkPhos  194<H>  11-30                Assessment/Plan  49yo M, with no significant PMH, transferred from [place] for further management of worsening obstruction of R kidney secondary to 2mm distal ureteral calculus.    - pt SOB, palpitations, diaphoretic. Wells score 0. s/p IR renal jocelyne removal   - stat EKG and cxr   - stat b/l dopplers to check for DVT  - stat cardiac enzymes and d-dimer to rule out ACS/PE  - stat BMP to monitor renal function 2/2 potential need of contrast  - will speak with urology dr Sy before starting DVT ppx, what is Cr threshold for contrast if necessary   - will consider CTA vs V/Q scan pending results of tests mentioned above  - continue to monitor  - RN to notify of any changes Called by RN for pt with SOB, palpitations, diaphoresis. Patient is non-smoker, denies alcohol use. Wells score 0. Patient denies being in pain, no pain medication standing or PRN. Patient had renal calculus removed today by IR, lovenox was held for procedure.       T(C): 37.8 (11-30-20 @ 17:57), Max: 37.8 (11-29-20 @ 21:23)  HR: 112 (11-30-20 @ 17:57) (98 - 118)  BP: 144/96 (11-30-20 @ 18:09) (139/86 - 183/90)  RR: 18 (11-30-20 @ 17:57) (17 - 25)  SpO2: 94% (11-30-20 @ 17:57) (92% - 100%)  Wt(kg): --    Physical :  Gen- NAD, diaphoretic, appears SOB however speaking in full sentences   Cardio - s+1,s+2, rrr, no murmur  Lung - cta b/l, no wheeze, no rhonchi, no rales   Abdomen- +BS, NT, no guarding, no rebound, no masses, hard, distended  Ext- no edema, 2+ pulses b/l, negative hommans sign       LABS:                        11.0   14.58 )-----------( 319      ( 30 Nov 2020 07:40 )             32.2     11-30    140  |  106  |  14  ----------------------------<  98  4.0   |  28  |  1.50<H>    Ca    8.2<L>      30 Nov 2020 07:40    TPro  6.5  /  Alb  2.3<L>  /  TBili  0.8  /  DBili  x   /  AST  13<L>  /  ALT  30  /  AlkPhos  194<H>  11-30                Assessment/Plan  49yo M, with no significant PMH, transferred from [place] for further management of worsening obstruction of R kidney secondary to 2mm distal ureteral calculus.    - pt SOB, palpitations, diaphoretic. Wells score 0. s/p IR renal jocelyne removal   - stat EKG and cxr   - stat b/l dopplers to r/o DVT  - stat cardiac enzymes and d-dimer to rule out ACS/PE  - stat BMP to monitor renal function 2/2 potential need of contrast  - will speak with urology dr Sy before starting DVT ppx, what is Cr threshold for contrast if necessary   - will consider CTA vs V/Q scan pending results of tests mentioned above  - continue to monitor  - RN to notify of any changes Called by RN for pt with SOB, palpitations, diaphoresis. Patient is non-smoker, denies alcohol use. Wells score 0. Patient denies being in pain, no pain medication standing or PRN. Patient had renal calculus removed today by IR, lovenox was held for procedure.       T(C): 37.8 (11-30-20 @ 17:57), Max: 37.8 (11-29-20 @ 21:23)  HR: 112 (11-30-20 @ 17:57) (98 - 118)  BP: 144/96 (11-30-20 @ 18:09) (139/86 - 183/90)  RR: 18 (11-30-20 @ 17:57) (17 - 25)  SpO2: 94% (11-30-20 @ 17:57) (92% - 100%)  Wt(kg): --    Physical :  Gen- NAD, diaphoretic, appears SOB however speaking in full sentences   Cardio - s+1,s+2, rrr, no murmur  Lung - cta b/l, no wheeze, no rhonchi, no rales   Abdomen- +BS, NT, no guarding, no rebound, no masses, hard, distended  Ext- no edema, 2+ pulses b/l, negative hommans sign       LABS:                        11.0   14.58 )-----------( 319      ( 30 Nov 2020 07:40 )             32.2     11-30    140  |  106  |  14  ----------------------------<  98  4.0   |  28  |  1.50<H>    Ca    8.2<L>      30 Nov 2020 07:40    TPro  6.5  /  Alb  2.3<L>  /  TBili  0.8  /  DBili  x   /  AST  13<L>  /  ALT  30  /  AlkPhos  194<H>  11-30                Assessment/Plan  49yo M, with no significant PMH, transferred from [place] for further management of worsening obstruction of R kidney secondary to 2mm distal ureteral calculus.    - pt SOB, palpitations, diaphoretic. Wells score 0. s/p IR renal jocelyne removal   - stat EKG and cxr   - stat b/l dopplers to r/o DVT  - stat cardiac enzymes and d-dimer to rule out ACS/PE  - stat BMP to monitor renal function 2/2 potential need of contrast  - will speak with urology dr Sy before starting DVT ppx, what is Cr threshold for contrast if necessary   - will consider CTA vs V/Q scan pending results of tests mentioned above  - continue to monitor  - RN to notify of any changes      Addendum 22:15:  - Patient D-dimer returned 1950 leading to PE being a more likely cause of patients symptoms.   - cardiac enzymes negative, unlikely cardiac source of symptoms given no cardiac hx  - As per Dr. Sy, patients renal function stable for contrast, could receive lovenox if deemed necessary, hematuria likely, continue to monitor  - will do STAT CTA chest to confirm PE Called by RN for pt with SOB, palpitations, diaphoresis. Patient is non-smoker, denies alcohol use. Wells score 0. Patient denies being in pain, no pain medication standing or PRN. Patient had renal calculus removed today by IR, lovenox was held for procedure.       T(C): 37.8 (11-30-20 @ 17:57), Max: 37.8 (11-29-20 @ 21:23)  HR: 112 (11-30-20 @ 17:57) (98 - 118)  BP: 144/96 (11-30-20 @ 18:09) (139/86 - 183/90)  RR: 18 (11-30-20 @ 17:57) (17 - 25)  SpO2: 94% (11-30-20 @ 17:57) (92% - 100%)  Wt(kg): --    Physical :  Gen- NAD, diaphoretic, appears SOB however speaking in full sentences   Cardio - s+1,s+2, rrr, no murmur  Lung - cta b/l, no wheeze, no rhonchi, no rales   Abdomen- +BS, NT, no guarding, no rebound, no masses, hard, distended  Ext- no edema, 2+ pulses b/l, negative hommans sign       LABS:                        11.0   14.58 )-----------( 319      ( 30 Nov 2020 07:40 )             32.2     11-30    140  |  106  |  14  ----------------------------<  98  4.0   |  28  |  1.50<H>    Ca    8.2<L>      30 Nov 2020 07:40    TPro  6.5  /  Alb  2.3<L>  /  TBili  0.8  /  DBili  x   /  AST  13<L>  /  ALT  30  /  AlkPhos  194<H>  11-30                Assessment/Plan  51yo M, with no significant PMH, transferred from [place] for further management of worsening obstruction of R kidney secondary to 2mm distal ureteral calculus.    - pt SOB, palpitations, diaphoretic. Wells score 0. s/p IR renal jocelyne removal   - stat EKG and cxr   - stat b/l dopplers to r/o DVT  - stat cardiac enzymes and d-dimer to rule out ACS/PE  - stat BMP to monitor renal function 2/2 potential need of contrast  - will speak with urology dr Sy before starting DVT ppx, what is Cr threshold for contrast if necessary   - will consider CTA vs V/Q scan pending results of tests mentioned above  - continue to monitor  - RN to notify of any changes      Addendum 22:15:  - Patient D-dimer returned 1950 leading to PE being a more likely cause of patients symptoms.   - cardiac enzymes negative, unlikely cardiac source of symptoms given no cardiac hx  - As per Dr. Sy, patients renal function stable for contrast, could receive lovenox if deemed necessary, hematuria likely, continue to monitor  - will do STAT CTA chest to confirm PE      Addendum 00:23 12/1/20  - CTA read pending, contacted [facility]   - CXR negative for active pulm disease  - b/l doppler negative for DVT  - EKG unchanged from admission   - f/u AM cardiac enzymes Called by RN for pt with SOB, palpitations, diaphoresis. Patient is non-smoker, denies alcohol use. Wells score 0. Patient denies being in pain, no pain medication standing or PRN. Patient had renal calculus removed today by IR, lovenox was held for procedure.       T(C): 37.8 (11-30-20 @ 17:57), Max: 37.8 (11-29-20 @ 21:23)  HR: 112 (11-30-20 @ 17:57) (98 - 118)  BP: 144/96 (11-30-20 @ 18:09) (139/86 - 183/90)  RR: 18 (11-30-20 @ 17:57) (17 - 25)  SpO2: 94% (11-30-20 @ 17:57) (92% - 100%)  Wt(kg): --    Physical :  Gen- NAD, diaphoretic, appears SOB however speaking in full sentences   Cardio - s+1,s+2, rrr, no murmur  Lung - cta b/l, no wheeze, no rhonchi, no rales   Abdomen- +BS, NT, no guarding, no rebound, no masses, hard, distended  Ext- no edema, 2+ pulses b/l, negative hommans sign       LABS:                        11.0   14.58 )-----------( 319      ( 30 Nov 2020 07:40 )             32.2     11-30    140  |  106  |  14  ----------------------------<  98  4.0   |  28  |  1.50<H>    Ca    8.2<L>      30 Nov 2020 07:40    TPro  6.5  /  Alb  2.3<L>  /  TBili  0.8  /  DBili  x   /  AST  13<L>  /  ALT  30  /  AlkPhos  194<H>  11-30                Assessment/Plan  51yo M, with no significant PMH, transferred from [place] for further management of worsening obstruction of R kidney secondary to 2mm distal ureteral calculus.    - pt SOB, palpitations, diaphoretic. Wells score 0. s/p IR renal jocelyne removal   - stat EKG and cxr   - stat b/l dopplers to r/o DVT  - stat cardiac enzymes and d-dimer to rule out ACS/PE  - stat BMP to monitor renal function 2/2 potential need of contrast  - will speak with urology dr Sy before starting DVT ppx, what is Cr threshold for contrast if necessary   - will consider CTA vs V/Q scan pending results of tests mentioned above  - continue to monitor  - RN to notify of any changes      Addendum 22:15:  - Patient D-dimer returned 1950 leading to PE being a more likely cause of patients symptoms.   - cardiac enzymes negative, unlikely cardiac source of symptoms given no cardiac hx  - Cr trending down to 1.3   - As per Dr. Sy, patients renal function stable for contrast, could receive lovenox if deemed necessary, hematuria likely, continue to monitor  - will do STAT CTA chest to confirm PE      Addendum 00:23 12/1/20  - CTA read pending, contacted [facility]   - CXR negative for active pulm disease  - b/l doppler negative for DVT  - EKG unchanged from admission   - f/u AM cardiac enzymes Called by RN for pt with SOB, palpitations, diaphoresis. Patient is non-smoker, denies alcohol use. Wells score 0. Patient denies being in pain, no pain medication standing or PRN. Patient had renal calculus removed today by IR, lovenox was held for procedure.       T(C): 37.8 (11-30-20 @ 17:57), Max: 37.8 (11-29-20 @ 21:23)  HR: 112 (11-30-20 @ 17:57) (98 - 118)  BP: 144/96 (11-30-20 @ 18:09) (139/86 - 183/90)  RR: 18 (11-30-20 @ 17:57) (17 - 25)  SpO2: 94% (11-30-20 @ 17:57) (92% - 100%)  Wt(kg): --    Physical :  Gen- NAD, diaphoretic, appears SOB however speaking in full sentences   Cardio - s+1,s+2, rrr, no murmur  Lung - cta b/l, no wheeze, no rhonchi, no rales   Abdomen- +BS, NT, no guarding, no rebound, no masses, hard, distended  Ext- no edema, 2+ pulses b/l, negative hommans sign       LABS:                        11.0   14.58 )-----------( 319      ( 30 Nov 2020 07:40 )             32.2     11-30    140  |  106  |  14  ----------------------------<  98  4.0   |  28  |  1.50<H>    Ca    8.2<L>      30 Nov 2020 07:40    TPro  6.5  /  Alb  2.3<L>  /  TBili  0.8  /  DBili  x   /  AST  13<L>  /  ALT  30  /  AlkPhos  194<H>  11-30                Assessment/Plan  51yo M, with no significant PMH, transferred from [place] for further management of worsening obstruction of R kidney secondary to 2mm distal ureteral calculus.    - pt SOB, palpitations, diaphoretic. Wells score 0. s/p IR renal jocelyne removal   - stat EKG and cxr   - stat b/l dopplers to r/o DVT  - stat cardiac enzymes and d-dimer to rule out ACS/PE  - stat BMP to monitor renal function 2/2 potential need of contrast  - will speak with urology dr Sy before starting DVT ppx, what is Cr threshold for contrast if necessary   - will consider CTA vs V/Q scan pending results of tests mentioned above  - continue to monitor  - RN to notify of any changes      Addendum 22:15:  - Patient D-dimer returned 1950 leading to PE being a more likely cause of patients symptoms.   - cardiac enzymes negative, unlikely cardiac source of symptoms given no cardiac hx  - Cr trending down to 1.3   - As per Dr. Sy, patients renal function stable for contrast, could receive lovenox if deemed necessary, hematuria likely, continue to monitor  - will do STAT CTA chest to confirm PE      Addendum 00:23 12/1/20  - CTA negative for PE as per johan attending  - CXR negative for active pulm disease  - b/l doppler negative for DVT  - EKG unchanged from admission   - f/u AM cardiac enzymes

## 2020-11-30 NOTE — PROGRESS NOTE ADULT - ASSESSMENT
51yo M, with no significant PMH, transferred from [place] for further management of worsening obstruction of R kidney secondary to 2mm distal ureteral calculus.

## 2020-11-30 NOTE — PROGRESS NOTE ADULT - PROBLEM SELECTOR PLAN 2
kenna sec to - 2mm distal R ureter stone present since 11/24 though now with worsening renal obstruction  - Patient was started on Flomax 0.4mg daily on initial ED presentation, will hold while NPO for now  - Continue IV hydration  - Pain control with IV Tylenol 1000mg PRN q6h for moderate pain while NPO and Dilaudid 1mg q6h PRN for severe pain; avoid NSAIDs given KENNA  - Uro Dr. Sy consulted, patient will go to OR today for stent placement  - patient is medically optimized for moderate risk procedure  - NPO, hold AC for OR

## 2020-11-30 NOTE — PROGRESS NOTE ADULT - PROBLEM SELECTOR PLAN 1
poa less suspicion sec to obstructives uropathy - Pt afebrile though leukocytosis and tachycardia present with suspected renal source  - S/p 2L NS in the ED, iv fluid 125 cc/ hr   - lactate wnl, f/u blood Cx's  pending result, urine Cx collected at [facility] ED with <10K normal jazz  - Monitor temperature curve and daily CBC

## 2020-11-30 NOTE — PRE-OP CHECKLIST - TO WHOM
" [id number] [address]  Arnie Metzger at [facility]. 15     Occupational History   â¢ retired  at a nursing home      [facility] CMS/HCC) 2016   â¢ Congestive cardiac failure (CMS/HCC)    â¢ COPD (chronic obstructive pulmonary disease) ([facility])    â¢ Coronary atherosclerosis of native Darwin Alonso, Gastritis/Candida   â¢ FLEXIBLE SIGMOIDOSCOPY BX  80    Diann Callahan MD [facility]

## 2020-12-01 ENCOUNTER — TRANSCRIPTION ENCOUNTER (OUTPATIENT)
Age: 50
End: 2020-12-01

## 2020-12-01 VITALS
OXYGEN SATURATION: 97 % | RESPIRATION RATE: 18 BRPM | SYSTOLIC BLOOD PRESSURE: 164 MMHG | HEART RATE: 97 BPM | DIASTOLIC BLOOD PRESSURE: 96 MMHG | TEMPERATURE: 98 F

## 2020-12-01 DIAGNOSIS — N23 UNSPECIFIED RENAL COLIC: ICD-10-CM

## 2020-12-01 PROBLEM — N20.0 CALCULUS OF KIDNEY: Chronic | Status: ACTIVE | Noted: 2020-11-29

## 2020-12-01 LAB
ALBUMIN SERPL ELPH-MCNC: 2.5 G/DL — LOW (ref 3.3–5)
ALP SERPL-CCNC: 378 U/L — HIGH (ref 40–120)
ALT FLD-CCNC: 57 U/L — SIGNIFICANT CHANGE UP (ref 12–78)
ANION GAP SERPL CALC-SCNC: 7 MMOL/L — SIGNIFICANT CHANGE UP (ref 5–17)
AST SERPL-CCNC: 35 U/L — SIGNIFICANT CHANGE UP (ref 15–37)
BASOPHILS # BLD AUTO: 0.01 K/UL — SIGNIFICANT CHANGE UP (ref 0–0.2)
BASOPHILS NFR BLD AUTO: 0.1 % — SIGNIFICANT CHANGE UP (ref 0–2)
BILIRUB SERPL-MCNC: 0.4 MG/DL — SIGNIFICANT CHANGE UP (ref 0.2–1.2)
BUN SERPL-MCNC: 13 MG/DL — SIGNIFICANT CHANGE UP (ref 7–23)
CALCIUM SERPL-MCNC: 9 MG/DL — SIGNIFICANT CHANGE UP (ref 8.5–10.1)
CHLORIDE SERPL-SCNC: 103 MMOL/L — SIGNIFICANT CHANGE UP (ref 96–108)
CK MB BLD-MCNC: <1.3 % — SIGNIFICANT CHANGE UP (ref 0–3.5)
CK MB CFR SERPL CALC: <1 NG/ML — SIGNIFICANT CHANGE UP (ref 0–3.6)
CK SERPL-CCNC: 76 U/L — SIGNIFICANT CHANGE UP (ref 26–308)
CO2 SERPL-SCNC: 30 MMOL/L — SIGNIFICANT CHANGE UP (ref 22–31)
CREAT SERPL-MCNC: 1 MG/DL — SIGNIFICANT CHANGE UP (ref 0.5–1.3)
EOSINOPHIL # BLD AUTO: 0.01 K/UL — SIGNIFICANT CHANGE UP (ref 0–0.5)
EOSINOPHIL NFR BLD AUTO: 0.1 % — SIGNIFICANT CHANGE UP (ref 0–6)
GLUCOSE SERPL-MCNC: 130 MG/DL — HIGH (ref 70–99)
HCT VFR BLD CALC: 34.1 % — LOW (ref 39–50)
HGB BLD-MCNC: 11.5 G/DL — LOW (ref 13–17)
IMM GRANULOCYTES NFR BLD AUTO: 0.5 % — SIGNIFICANT CHANGE UP (ref 0–1.5)
LYMPHOCYTES # BLD AUTO: 0.72 K/UL — LOW (ref 1–3.3)
LYMPHOCYTES # BLD AUTO: 6.8 % — LOW (ref 13–44)
MCHC RBC-ENTMCNC: 29.6 PG — SIGNIFICANT CHANGE UP (ref 27–34)
MCHC RBC-ENTMCNC: 33.7 GM/DL — SIGNIFICANT CHANGE UP (ref 32–36)
MCV RBC AUTO: 87.7 FL — SIGNIFICANT CHANGE UP (ref 80–100)
MONOCYTES # BLD AUTO: 0.82 K/UL — SIGNIFICANT CHANGE UP (ref 0–0.9)
MONOCYTES NFR BLD AUTO: 7.8 % — SIGNIFICANT CHANGE UP (ref 2–14)
NEUTROPHILS # BLD AUTO: 8.91 K/UL — HIGH (ref 1.8–7.4)
NEUTROPHILS NFR BLD AUTO: 84.7 % — HIGH (ref 43–77)
NRBC # BLD: 0 /100 WBCS — SIGNIFICANT CHANGE UP (ref 0–0)
PLATELET # BLD AUTO: 362 K/UL — SIGNIFICANT CHANGE UP (ref 150–400)
POTASSIUM SERPL-MCNC: 3.9 MMOL/L — SIGNIFICANT CHANGE UP (ref 3.5–5.3)
POTASSIUM SERPL-SCNC: 3.9 MMOL/L — SIGNIFICANT CHANGE UP (ref 3.5–5.3)
PROT SERPL-MCNC: 7.1 G/DL — SIGNIFICANT CHANGE UP (ref 6–8.3)
RBC # BLD: 3.89 M/UL — LOW (ref 4.2–5.8)
RBC # FLD: 13.3 % — SIGNIFICANT CHANGE UP (ref 10.3–14.5)
SODIUM SERPL-SCNC: 140 MMOL/L — SIGNIFICANT CHANGE UP (ref 135–145)
TROPONIN I SERPL-MCNC: <.015 NG/ML — SIGNIFICANT CHANGE UP (ref 0.01–0.04)
WBC # BLD: 10.52 K/UL — HIGH (ref 3.8–10.5)
WBC # FLD AUTO: 10.52 K/UL — HIGH (ref 3.8–10.5)

## 2020-12-01 PROCEDURE — 85379 FIBRIN DEGRADATION QUANT: CPT

## 2020-12-01 PROCEDURE — 80048 BASIC METABOLIC PNL TOTAL CA: CPT

## 2020-12-01 PROCEDURE — 88300 SURGICAL PATH GROSS: CPT

## 2020-12-01 PROCEDURE — 93005 ELECTROCARDIOGRAM TRACING: CPT

## 2020-12-01 PROCEDURE — 82365 CALCULUS SPECTROSCOPY: CPT

## 2020-12-01 PROCEDURE — 80053 COMPREHEN METABOLIC PANEL: CPT

## 2020-12-01 PROCEDURE — 71045 X-RAY EXAM CHEST 1 VIEW: CPT

## 2020-12-01 PROCEDURE — C1769: CPT

## 2020-12-01 PROCEDURE — 99053 MED SERV 10PM-8AM 24 HR FAC: CPT

## 2020-12-01 PROCEDURE — 71275 CT ANGIOGRAPHY CHEST: CPT

## 2020-12-01 PROCEDURE — 99285 EMERGENCY DEPT VISIT HI MDM: CPT

## 2020-12-01 PROCEDURE — C2617: CPT

## 2020-12-01 PROCEDURE — C1758: CPT

## 2020-12-01 PROCEDURE — 93970 EXTREMITY STUDY: CPT

## 2020-12-01 PROCEDURE — 83605 ASSAY OF LACTIC ACID: CPT

## 2020-12-01 PROCEDURE — 85025 COMPLETE CBC W/AUTO DIFF WBC: CPT

## 2020-12-01 PROCEDURE — 82553 CREATINE MB FRACTION: CPT

## 2020-12-01 PROCEDURE — 86769 SARS-COV-2 COVID-19 ANTIBODY: CPT

## 2020-12-01 PROCEDURE — 84484 ASSAY OF TROPONIN QUANT: CPT

## 2020-12-01 PROCEDURE — 36415 COLL VENOUS BLD VENIPUNCTURE: CPT

## 2020-12-01 PROCEDURE — 87040 BLOOD CULTURE FOR BACTERIA: CPT

## 2020-12-01 PROCEDURE — 99239 HOSP IP/OBS DSCHRG MGMT >30: CPT | Mod: GC

## 2020-12-01 PROCEDURE — C1889: CPT

## 2020-12-01 PROCEDURE — U0003: CPT

## 2020-12-01 PROCEDURE — 76000 FLUOROSCOPY <1 HR PHYS/QHP: CPT

## 2020-12-01 PROCEDURE — 82550 ASSAY OF CK (CPK): CPT

## 2020-12-01 RX ORDER — CEFPODOXIME PROXETIL 100 MG
1 TABLET ORAL
Qty: 10 | Refills: 0
Start: 2020-12-01 | End: 2020-12-05

## 2020-12-01 RX ORDER — CEFUROXIME AXETIL 250 MG
1 TABLET ORAL
Qty: 0 | Refills: 0 | DISCHARGE

## 2020-12-01 RX ORDER — LACTOBACILLUS ACIDOPHILUS 100MM CELL
1 CAPSULE ORAL
Qty: 10 | Refills: 0
Start: 2020-12-01 | End: 2020-12-05

## 2020-12-01 RX ORDER — TAMSULOSIN HYDROCHLORIDE 0.4 MG/1
1 CAPSULE ORAL
Qty: 0 | Refills: 0 | DISCHARGE

## 2020-12-01 RX ORDER — CEFDINIR 250 MG/5ML
1 POWDER, FOR SUSPENSION ORAL
Qty: 10 | Refills: 0
Start: 2020-12-01 | End: 2020-12-05

## 2020-12-01 NOTE — DISCHARGE NOTE PROVIDER - CARE PROVIDER_API CALL
Juan Ramon Daly  INTERNAL MEDICINE  [address], [place] [id number]  Phone: [phone]  Fax: [phone]  Follow Up Time: 2 weeks    REBEKAH POOLE  Internal Medicine  [address] [place] [address]  Phone: [phone]  Fax: [phone]  Established Patient  Follow Up Time: 1-3 days   Juan Ramon Daly  INTERNAL MEDICINE  [address], [place] [id number]  Phone: [phone]  Fax: [phone]  Follow Up Time: 2 weeks    REBEKAH POOLE  Internal Medicine  [address] [place] [address]  Phone: [phone]  Fax: [phone]  Established Patient  Follow Up Time: 1-3 days    Gurwinder Sy  UROLOGY  [address] [facility], [address]  [place] [address]  Phone: [phone]  Fax: [phone]  Follow Up Time: 1 week

## 2020-12-01 NOTE — DISCHARGE NOTE PROVIDER - HOSPITAL COURSE
HPI:  51yo M, with no significant PMH, transferred from [place] complaining of flank pain. Patient originally presented to  ED on 11/24 c/o L lower back pain and the urge to urinate though not being able to. CT renal stone hunt was performed and showed 2mm distal R ureteral stone with mild hydroureteronephrosis. At that ED visit, pain improved after Toradol 15mg x1, and patient was discharged on Percocet, Flomax, and Ceftin with recommended outpatient urology follow up with Dr. Lund. Today patient presented again to the  ED complaining of same pain, which improved with his prescribed Percocet, however he had finished the 3d supply. Additionally, patient reports feeling bloated, having not passed a bowl movement in 4 days. Repeat CT stone hunt performed today and demonstrated worsening obstruction of R kidney 2/2 2mm stone with extensive perinephric free fluid. Case discussed with uro Dr. Sy who recommended keeping patient NPO though no current plans for emergent surgical intervention at this time. Patient endorses abd pain R > L; denies fever, chills, chest pain, SOB.    In the [place] ED  VS: T 98.1  /95 RR 20 SpO2 98% on RA  Significant labs include WBC 23.88, PMNs 85.5%, Na 124, Cl 89, Cr 1.84.  UA: positive protein and blood, few bacteria  UCX: <10K normal urogenital jazz  CT renal stone hunt 11/29: Stable location of 2mm distal R ureteral stone with worsening R hydronephrosis and free perinephric fluid suggestive of forniceal rupture  EKG: Sinus tachycardia, Possible Left atrial enlargement, Nonspecific T wave abnormality    Patient received 2L NS, Toradol 15mg x1, Dilaudid 0.5mg x1, mag citrate, and Zosyn x1. (29 Nov 2020 05:27)      ---  HOSPITAL COURSE: Patient was started on zosyn 3.375g q8h. Pain was controlled with tylenol 650 mg for mild pain, oxycodone 5 mg for moderate pain, and dilaudid 0.5 mg for severe pain. Patient's pain persisted and was taken to the OR by urology for laser lithotripsy and stent placement on 11/30/20. Post-operatively patient was tachycardic, diaphoretic, and tachypneic. D-dimer was found to be 1950. Venous doppler of BLE was negative for DVT and CT angiography was negative for PE. Patient with normal HR on day of discharge. Patient's KENNA resolved with surgical intervention. R flank pain resolved and patient passing urine with no complications. Patient seen and evaluated at bedside on day of discharge. Patient medically optimized for discharge to home.     ---  CONSULTANTS:   Urology - Dr. Sy     ---  TIME SPENT:  I, the attending physician, was physically present for the key portions of the evaluation and management (E/M) service provided. The total amount of time spent reviewing the hospital notes, laboratory values, imaging findings, assessing/counseling the patient, discussing with consultant physicians, social work, nursing staff was -- minutes    ---  Primary care provider was made aware of plan for discharge:      [  ] NO     [  ] YES   HPI:  49yo M, with no significant PMH, transferred from [place] complaining of flank pain. Patient originally presented to  ED on 11/24 c/o L lower back pain and the urge to urinate though not being able to. CT renal stone hunt was performed and showed 2mm distal R ureteral stone with mild hydroureteronephrosis. At that ED visit, pain improved after Toradol 15mg x1, and patient was discharged on Percocet, Flomax, and Ceftin with recommended outpatient urology follow up with Dr. Lund. Today patient presented again to the  ED complaining of same pain, which improved with his prescribed Percocet, however he had finished the 3d supply. Additionally, patient reports feeling bloated, having not passed a bowl movement in 4 days. Repeat CT stone hunt performed today and demonstrated worsening obstruction of R kidney 2/2 2mm stone with extensive perinephric free fluid. Case discussed with uro Dr. Sy who recommended keeping patient NPO though no current plans for emergent surgical intervention at this time. Patient endorses abd pain R > L; denies fever, chills, chest pain, SOB.    In the [place] ED  VS: T 98.1  /95 RR 20 SpO2 98% on RA  Significant labs include WBC 23.88, PMNs 85.5%, Na 124, Cl 89, Cr 1.84.  UA: positive protein and blood, few bacteria  UCX: <10K normal urogenital jazz  CT renal stone hunt 11/29: Stable location of 2mm distal R ureteral stone with worsening R hydronephrosis and free perinephric fluid suggestive of forniceal rupture  EKG: Sinus tachycardia, Possible Left atrial enlargement, Nonspecific T wave abnormality    Patient received 2L NS, Toradol 15mg x1, Dilaudid 0.5mg x1, mag citrate, and Zosyn x1. (29 Nov 2020 05:27)      ---  HOSPITAL COURSE: Patient was started on zosyn 3.375g q8h. Pain was controlled with tylenol 650 mg for mild pain, oxycodone 5 mg for moderate pain, and dilaudid 0.5 mg for severe pain. Patient's pain persisted and was taken to the OR by urology for laser lithotripsy and stent placement on 11/30/20. Post-operatively patient was tachycardic, diaphoretic, and tachypneic. D-dimer was found to be 1950. Venous doppler of BLE was negative for DVT and CT angiography was negative for PE. Patient clinically improved on day of discharge. Patient's KENNA resolved with surgical intervention. R flank pain resolved and patient passing urine with no complications. Patient seen and evaluated at bedside on day of discharge. Patient medically stable for discharge to home with close follow up for further care and management.     ---  CONSULTANTS:   Urology - Dr. Sy     ---  TIME SPENT: 55 minutes

## 2020-12-01 NOTE — DISCHARGE NOTE NURSING/CASE MANAGEMENT/SOCIAL WORK - PATIENT PORTAL LINK FT
You can access the FollowMyHealth Patient Portal offered by [facility] by registering at the following website: [email or web address]. By joining [employer]’s FollowMyHealth portal, you will also be able to view your health information using other applications (apps) compatible with our system.

## 2020-12-01 NOTE — PROGRESS NOTE ADULT - SUBJECTIVE AND OBJECTIVE BOX
INTERVAL HPI/OVERNIGHT EVENTS:  Pain resolved.    MEDICATIONS  (STANDING):  influenza   Vaccine 0.5 milliLiter(s) IntraMuscular once    MEDICATIONS  (PRN):        Vital Signs Last 24 Hrs  T(C): 36.8 (01 Dec 2020 13:02), Max: 37.8 (30 Nov 2020 17:57)  T(F): 98.2 (01 Dec 2020 13:02), Max: 100 (30 Nov 2020 17:57)  HR: 97 (01 Dec 2020 13:02) (95 - 112)  BP: 164/96 (01 Dec 2020 13:02) (143/99 - 183/90)  BP(mean): --  RR: 18 (01 Dec 2020 13:02) (17 - 25)  SpO2: 97% (01 Dec 2020 13:02) (94% - 100%)    PHYSICAL EXAM:    No CVAT  ABDOMEN: benign    LABS:                        11.5   10.52 )-----------( 362      ( 01 Dec 2020 05:45 )             34.1     12-01    140  |  103  |  13  ----------------------------<  130<H>  3.9   |  30  |  1.00    Ca    9.0      01 Dec 2020 05:45    TPro  7.1  /  Alb  2.5<L>  /  TBili  0.4  /  DBili  x   /  AST  35  /  ALT  57  /  AlkPhos  378<H>  12-01        Urine culture:  11-29 @ 11:41 --   No growth to date.    Blood Cultures:  11-29 @ 11:41   No growth to date.  --  --    RADIOLOGY & ADDITIONAL TESTS:

## 2020-12-01 NOTE — DISCHARGE NOTE PROVIDER - CARE PROVIDERS DIRECT ADDRESSES
,[email or web address]_[email or web address],DirectAddress_Unknown,[email or web address]

## 2020-12-01 NOTE — DISCHARGE NOTE PROVIDER - PROVIDER TOKENS
PROVIDER:[TOKEN:[[id number]],FOLLOWUP:[2 weeks]],PROVIDER:[TOKEN:[[id number]],FOLLOWUP:[1-3 days],ESTABLISHEDPATIENT:[T]] PROVIDER:[TOKEN:[[id number]],FOLLOWUP:[2 weeks]],PROVIDER:[TOKEN:[[id number]],FOLLOWUP:[1-3 days],ESTABLISHEDPATIENT:[T]],PROVIDER:[TOKEN:[[id number]],FOLLOWUP:[1 week]]

## 2020-12-01 NOTE — DISCHARGE NOTE PROVIDER - NSDCMRMEDTOKEN_GEN_ALL_CORE_FT
Acidophilus oral capsule: 1 cap(s) orally 2 times a day, take while you&#x27;re on antibiotics   cefpodoxime 200 mg oral tablet: 1 tab(s) orally 2 times a day, last dose to be taken on 12/05/20  lisinopril 2.5 mg oral tablet: 1 tab(s) orally once a day   Acidophilus oral capsule: 1 cap(s) orally 2 times a day, take while you&#x27;re on antibiotics   cefdinir 300 mg oral capsule: 1 cap(s) orally 2 times a day, last dose on 12/5/20  lisinopril 2.5 mg oral tablet: 1 tab(s) orally once a day

## 2020-12-01 NOTE — DISCHARGE NOTE PROVIDER - NSDCCPCAREPLAN_GEN_ALL_CORE_FT
PRINCIPAL DISCHARGE DIAGNOSIS  Diagnosis: Nephrolithiasis  Assessment and Plan of Treatment: You presented with a 2 mm stone in your ureter  -you were taken to the OR to have laser lithotripsy and stent placement with urology  -you were started on IV antibiotics while you were in the hospital  -please continue to take oral antibiotics for 5 more days (starting today) to complete your 7 day course (last day on 12/5/20)  -drink plenty of fluids   -follow up with your primary care provider in 1-3 days of discharge      SECONDARY DISCHARGE DIAGNOSES  Diagnosis: Tachycardia  Assessment and Plan of Treatment: You heart rate was elevated during your hospitalization   -we worked you up for a pulmonary embolism which was negative  -please follow up with cardiology Dr. Daly within a few weeks after discharge    Diagnosis: KENNA (acute kidney injury)  Assessment and Plan of Treatment: You had a kidney injury due to the stone  -your kidney function improved after your procedure with urology     PRINCIPAL DISCHARGE DIAGNOSIS  Diagnosis: Nephrolithiasis  Assessment and Plan of Treatment: You presented with a 2 mm stone in your ureter  -you were taken to the OR to have laser lithotripsy and stent placement with urology  -you were started on IV antibiotics while you were in the hospital  -please continue to take oral antibiotics for 5 more days (starting today) to complete your 7 day course (last day on 12/5/20)  -drink plenty of fluids   -follow up with your primary care provider in 1-3 days of discharge      SECONDARY DISCHARGE DIAGNOSES  Diagnosis: Hypertension  Assessment and Plan of Treatment: Continue to take your home blood pressure medication as instructed by your primary care provider    Diagnosis: Tachycardia  Assessment and Plan of Treatment: You heart rate was elevated during your hospitalization   -we worked you up for a pulmonary embolism which was negative  -please follow up with cardiology Dr. Daly within a few weeks after discharge    Diagnosis: KENNA (acute kidney injury)  Assessment and Plan of Treatment: You had a kidney injury due to the stone  -your kidney function improved after your procedure with urology     PRINCIPAL DISCHARGE DIAGNOSIS  Diagnosis: Nephrolithiasis  Assessment and Plan of Treatment: You presented with a 2 mm stone in your ureter  -you were taken to the OR to have laser lithotripsy and stent placement with urology  -you were started on IV antibiotics while you were in the hospital  -please continue to take oral antibiotics for 5 more days (starting today) to complete your 7 day course (last day on 12/5/20)  -drink plenty of fluids   -follow up with your primary care provider in 1-3 days of discharge and urology Dr Sy within 1 week of discharge      SECONDARY DISCHARGE DIAGNOSES  Diagnosis: Hypertension  Assessment and Plan of Treatment: Continue to take your home blood pressure medication as instructed by your primary care provider    Diagnosis: Tachycardia  Assessment and Plan of Treatment: You heart rate was elevated during your hospitalization   -we worked you up for a pulmonary embolism which was negative  -please follow up with cardiology Dr. Daly within a few weeks after discharge    Diagnosis: KENNA (acute kidney injury)  Assessment and Plan of Treatment: You had a kidney injury due to the stone  -your kidney function improved after your procedure with urology

## 2020-12-02 LAB — SURGICAL PATHOLOGY STUDY: SIGNIFICANT CHANGE UP

## 2020-12-04 LAB
CULTURE RESULTS: SIGNIFICANT CHANGE UP
CULTURE RESULTS: SIGNIFICANT CHANGE UP
NIDUS STONE QN: SIGNIFICANT CHANGE UP
SPECIMEN SOURCE: SIGNIFICANT CHANGE UP
SPECIMEN SOURCE: SIGNIFICANT CHANGE UP

## 2020-12-28 ENCOUNTER — EMERGENCY (EMERGENCY)
Facility: HOSPITAL | Age: 50
LOS: 1 days | Discharge: HOME | End: 2020-12-28
Attending: EMERGENCY MEDICINE | Admitting: EMERGENCY MEDICINE
Payer: MEDICAID

## 2020-12-28 VITALS
RESPIRATION RATE: 17 BRPM | OXYGEN SATURATION: 99 % | TEMPERATURE: 98 F | HEART RATE: 79 BPM | SYSTOLIC BLOOD PRESSURE: 142 MMHG | DIASTOLIC BLOOD PRESSURE: 92 MMHG

## 2020-12-28 VITALS
HEIGHT: 66 IN | WEIGHT: 169.98 LBS | TEMPERATURE: 100 F | HEART RATE: 86 BPM | DIASTOLIC BLOOD PRESSURE: 90 MMHG | OXYGEN SATURATION: 99 % | SYSTOLIC BLOOD PRESSURE: 150 MMHG | RESPIRATION RATE: 16 BRPM

## 2020-12-28 DIAGNOSIS — Z98.890 OTHER SPECIFIED POSTPROCEDURAL STATES: Chronic | ICD-10-CM

## 2020-12-28 LAB
ALBUMIN SERPL ELPH-MCNC: 4 G/DL — SIGNIFICANT CHANGE UP (ref 3.3–5)
ALP SERPL-CCNC: 103 U/L — SIGNIFICANT CHANGE UP (ref 30–120)
ALT FLD-CCNC: 32 U/L DA — SIGNIFICANT CHANGE UP (ref 10–60)
ANION GAP SERPL CALC-SCNC: 9 MMOL/L — SIGNIFICANT CHANGE UP (ref 5–17)
APPEARANCE UR: CLEAR — SIGNIFICANT CHANGE UP
AST SERPL-CCNC: 18 U/L — SIGNIFICANT CHANGE UP (ref 10–40)
BACTERIA # UR AUTO: ABNORMAL
BASOPHILS # BLD AUTO: 0.04 K/UL — SIGNIFICANT CHANGE UP (ref 0–0.2)
BASOPHILS NFR BLD AUTO: 0.5 % — SIGNIFICANT CHANGE UP (ref 0–2)
BILIRUB SERPL-MCNC: 0.3 MG/DL — SIGNIFICANT CHANGE UP (ref 0.2–1.2)
BILIRUB UR-MCNC: NEGATIVE — SIGNIFICANT CHANGE UP
BUN SERPL-MCNC: 19 MG/DL — SIGNIFICANT CHANGE UP (ref 7–23)
CALCIUM SERPL-MCNC: 9.2 MG/DL — SIGNIFICANT CHANGE UP (ref 8.4–10.5)
CHLORIDE SERPL-SCNC: 101 MMOL/L — SIGNIFICANT CHANGE UP (ref 96–108)
CO2 SERPL-SCNC: 27 MMOL/L — SIGNIFICANT CHANGE UP (ref 22–31)
COLOR SPEC: YELLOW — SIGNIFICANT CHANGE UP
CREAT SERPL-MCNC: 1.05 MG/DL — SIGNIFICANT CHANGE UP (ref 0.5–1.3)
DIFF PNL FLD: ABNORMAL
EOSINOPHIL # BLD AUTO: 0.15 K/UL — SIGNIFICANT CHANGE UP (ref 0–0.5)
EOSINOPHIL NFR BLD AUTO: 2.1 % — SIGNIFICANT CHANGE UP (ref 0–6)
EPI CELLS # UR: SIGNIFICANT CHANGE UP
GLUCOSE SERPL-MCNC: 88 MG/DL — SIGNIFICANT CHANGE UP (ref 70–99)
GLUCOSE UR QL: NEGATIVE MG/DL — SIGNIFICANT CHANGE UP
HCT VFR BLD CALC: 40.5 % — SIGNIFICANT CHANGE UP (ref 39–50)
HGB BLD-MCNC: 14 G/DL — SIGNIFICANT CHANGE UP (ref 13–17)
IMM GRANULOCYTES NFR BLD AUTO: 0.3 % — SIGNIFICANT CHANGE UP (ref 0–1.5)
KETONES UR-MCNC: NEGATIVE — SIGNIFICANT CHANGE UP
LACTATE SERPL-SCNC: 1.5 MMOL/L — SIGNIFICANT CHANGE UP (ref 0.7–2)
LEUKOCYTE ESTERASE UR-ACNC: ABNORMAL
LYMPHOCYTES # BLD AUTO: 2 K/UL — SIGNIFICANT CHANGE UP (ref 1–3.3)
LYMPHOCYTES # BLD AUTO: 27.4 % — SIGNIFICANT CHANGE UP (ref 13–44)
MCHC RBC-ENTMCNC: 29.1 PG — SIGNIFICANT CHANGE UP (ref 27–34)
MCHC RBC-ENTMCNC: 34.6 GM/DL — SIGNIFICANT CHANGE UP (ref 32–36)
MCV RBC AUTO: 84.2 FL — SIGNIFICANT CHANGE UP (ref 80–100)
MONOCYTES # BLD AUTO: 0.7 K/UL — SIGNIFICANT CHANGE UP (ref 0–0.9)
MONOCYTES NFR BLD AUTO: 9.6 % — SIGNIFICANT CHANGE UP (ref 2–14)
NEUTROPHILS # BLD AUTO: 4.39 K/UL — SIGNIFICANT CHANGE UP (ref 1.8–7.4)
NEUTROPHILS NFR BLD AUTO: 60.1 % — SIGNIFICANT CHANGE UP (ref 43–77)
NITRITE UR-MCNC: NEGATIVE — SIGNIFICANT CHANGE UP
NRBC # BLD: 0 /100 WBCS — SIGNIFICANT CHANGE UP (ref 0–0)
PH UR: 8 — SIGNIFICANT CHANGE UP (ref 5–8)
PLATELET # BLD AUTO: 308 K/UL — SIGNIFICANT CHANGE UP (ref 150–400)
POTASSIUM SERPL-MCNC: 3.8 MMOL/L — SIGNIFICANT CHANGE UP (ref 3.5–5.3)
POTASSIUM SERPL-SCNC: 3.8 MMOL/L — SIGNIFICANT CHANGE UP (ref 3.5–5.3)
PROT SERPL-MCNC: 8.2 G/DL — SIGNIFICANT CHANGE UP (ref 6–8.3)
PROT UR-MCNC: 100 MG/DL
RBC # BLD: 4.81 M/UL — SIGNIFICANT CHANGE UP (ref 4.2–5.8)
RBC # FLD: 12.5 % — SIGNIFICANT CHANGE UP (ref 10.3–14.5)
RBC CASTS # UR COMP ASSIST: >50 /HPF (ref 0–4)
SODIUM SERPL-SCNC: 137 MMOL/L — SIGNIFICANT CHANGE UP (ref 135–145)
SP GR SPEC: 1.01 — SIGNIFICANT CHANGE UP (ref 1.01–1.02)
UROBILINOGEN FLD QL: NEGATIVE MG/DL — SIGNIFICANT CHANGE UP
WBC # BLD: 7.3 K/UL — SIGNIFICANT CHANGE UP (ref 3.8–10.5)
WBC # FLD AUTO: 7.3 K/UL — SIGNIFICANT CHANGE UP (ref 3.8–10.5)
WBC UR QL: SIGNIFICANT CHANGE UP

## 2020-12-28 PROCEDURE — 36415 COLL VENOUS BLD VENIPUNCTURE: CPT

## 2020-12-28 PROCEDURE — 81001 URINALYSIS AUTO W/SCOPE: CPT

## 2020-12-28 PROCEDURE — 83605 ASSAY OF LACTIC ACID: CPT

## 2020-12-28 PROCEDURE — 99284 EMERGENCY DEPT VISIT MOD MDM: CPT | Mod: 25

## 2020-12-28 PROCEDURE — 80053 COMPREHEN METABOLIC PANEL: CPT

## 2020-12-28 PROCEDURE — 96365 THER/PROPH/DIAG IV INF INIT: CPT

## 2020-12-28 PROCEDURE — 96361 HYDRATE IV INFUSION ADD-ON: CPT

## 2020-12-28 PROCEDURE — 74176 CT ABD & PELVIS W/O CONTRAST: CPT | Mod: 26

## 2020-12-28 PROCEDURE — 99283 EMERGENCY DEPT VISIT LOW MDM: CPT

## 2020-12-28 PROCEDURE — 85025 COMPLETE CBC W/AUTO DIFF WBC: CPT

## 2020-12-28 PROCEDURE — 74176 CT ABD & PELVIS W/O CONTRAST: CPT

## 2020-12-28 PROCEDURE — 87086 URINE CULTURE/COLONY COUNT: CPT

## 2020-12-28 PROCEDURE — 87040 BLOOD CULTURE FOR BACTERIA: CPT

## 2020-12-28 RX ORDER — CEFUROXIME AXETIL 250 MG
1 TABLET ORAL
Qty: 14 | Refills: 0
Start: 2020-12-28 | End: 2021-01-03

## 2020-12-28 RX ORDER — CEFTRIAXONE 500 MG/1
1000 INJECTION, POWDER, FOR SOLUTION INTRAMUSCULAR; INTRAVENOUS ONCE
Refills: 0 | Status: COMPLETED | OUTPATIENT
Start: 2020-12-28 | End: 2020-12-28

## 2020-12-28 RX ORDER — SODIUM CHLORIDE 9 MG/ML
1000 INJECTION INTRAMUSCULAR; INTRAVENOUS; SUBCUTANEOUS ONCE
Refills: 0 | Status: COMPLETED | OUTPATIENT
Start: 2020-12-28 | End: 2020-12-28

## 2020-12-28 RX ADMIN — SODIUM CHLORIDE 1000 MILLILITER(S): 9 INJECTION INTRAMUSCULAR; INTRAVENOUS; SUBCUTANEOUS at 18:00

## 2020-12-28 RX ADMIN — CEFTRIAXONE 1000 MILLIGRAM(S): 500 INJECTION, POWDER, FOR SOLUTION INTRAMUSCULAR; INTRAVENOUS at 18:00

## 2020-12-28 RX ADMIN — SODIUM CHLORIDE 1000 MILLILITER(S): 9 INJECTION INTRAMUSCULAR; INTRAVENOUS; SUBCUTANEOUS at 16:30

## 2020-12-28 RX ADMIN — CEFTRIAXONE 100 MILLIGRAM(S): 500 INJECTION, POWDER, FOR SOLUTION INTRAMUSCULAR; INTRAVENOUS at 17:20

## 2020-12-28 NOTE — ED CLERICAL - CLERICAL COMMENTS
called dr. gonzales at 1750 for dr. alvarado called dr. gonzales at 1750 for dr. alvarado.  called dr. gonzales again at 1823. Was told that dr. lin is covering and will give message again to riley

## 2020-12-28 NOTE — ED PROVIDER NOTE - OBJECTIVE STATEMENT
51 yo male with h/o kidney stones presents to the ED dysuria x 4 days. 49 yo male with h/o kidney stones presents to the ED dysuria and hematuria x 4 days. Patient was admitted on 11/29 for kidney stone. Patient had lithotripsy with stent placement for 2 mm obstructive stone. Patient has follow up appointment with Dr. Sy on 1/5. Denies fever, chills, chest pain, sob, abd pain, flank pain, increased urinary frequency/urgency, vomiting, diarrhea.     : Dr. Sy.

## 2020-12-28 NOTE — ED PROVIDER NOTE - CLINICAL SUMMARY MEDICAL DECISION MAKING FREE TEXT BOX
49 yo male with h/o kidney stones presents to the ED dysuria and hematuria x 4 days. Patient was admitted on 11/29 for kidney stone. Patient had lithotripsy with stent placement for 2 mm obstructive stone. Patient has follow up appointment with Dr. Sy on 1/5. Denies fever, chills, chest pain, sob, abd pain, flank pain, increased urinary frequency/urgency, vomiting, diarrhea. PE: as above. A/P: labs, ua, ct renal hunt. + trace leuks and blood in urine. CT with stent in place, + mild hydro. case discussed with urologist, recommends dc on abx, follow up .

## 2020-12-28 NOTE — ED PROVIDER NOTE - CARE PROVIDER_API CALL
Gurwinder Sy  UROLOGY  [address] [facility], [address]  [place] [address]  Phone: [phone]  Fax: [phone]  Follow Up Time: 1-3 Days

## 2020-12-28 NOTE — ED PROVIDER NOTE - PATIENT PORTAL LINK FT
You can access the FollowMyHealth Patient Portal offered by [facility] by registering at the following website: [email or web address]. By joining [employer]’s FollowMyHealth portal, you will also be able to view your health information using other applications (apps) compatible with our system.

## 2020-12-28 NOTE — ED ADULT NURSE NOTE - PSH
H/O laminectomy    H/O laminectomy  2008   H/O laminectomy    H/O laminectomy  2008  H/O lithotripsy  with ureteral stent 11/2020

## 2020-12-28 NOTE — ED PROVIDER NOTE - PROGRESS NOTE DETAILS
Cristiano OMALLEY for Dr. Marr: 49 y/o male with hx of kidney stone, was admitted to [facility] last month for kidney stone had KENNA and required lithotripsy and stent placement, was feeling better until 4 days ago developed dysuria, hematuria again. Denies fever, cough, SOB, N/V, abd pain or other sx.  Urologist: Dr. Sy.  Physical exam: Vital signs normal, afebrile, no distress. Abd soft, nt, no CVAT, no suprapubic tenderness. Urine sample looks dark and cloudy  MDM: Dysuria, r/o UTI, may need IV abx and CT renal to r/o hydronephrosis. Case discussed with urologist Dr. Morejon - recommend dc on abx, follow up outpatient. Reevaluated patient at bedside.  Patient feeling much improved.  Discussed the results of all diagnostic testing in ED and copies of all available reports given.   An opportunity to ask questions was provided.  Discussed the importance of prompt, close medical follow-up.  Patient will return with any changes, concerns or persistent/worsening symptoms.  Understanding of all instructions verbalized.

## 2020-12-28 NOTE — ED ADULT NURSE NOTE - NS ED NOTE ABUSE RESPONSE YN
well nourished/BMI = 31.7, no edema noted, Nutrition focused physical exam conducted ;   Subcutaneous fat loss: [mild ] Orbital fat pads region, [wdl ]Buccal fat region, [wdl ]Triceps region,  [wdl ]Ribs region.  Muscle wasting: [wdl ]Temples region, [mild ]Clavicle region, [wdl ]Shoulder region, [wdl ]Scapula region, [unable ]Interosseous region,  [wdl ]thigh region, [wdl ]Calf region Yes

## 2020-12-28 NOTE — ED ADULT NURSE NOTE - OBJECTIVE STATEMENT
Patient is s/p lithotripsy with ureteral stent placement by Dr. Sy at [facility] on 11/30/2020. Patient has noticed his urine is dark and cloudy and he has burning with urination. Patient tried to call Dr. Sy's office today but it was closed so he came here. Patient denies any retention of urine, noted to have temp 99.9 in ED but was unaware of fever at home. Patient was discharged after lithotripsy on antibiotic Cefdinir which he completed full course early December.

## 2020-12-29 PROBLEM — N20.0 CALCULUS OF KIDNEY: Chronic | Status: ACTIVE | Noted: 2020-11-29

## 2020-12-29 LAB
CULTURE RESULTS: NO GROWTH — SIGNIFICANT CHANGE UP
SPECIMEN SOURCE: SIGNIFICANT CHANGE UP

## 2021-01-02 LAB
CULTURE RESULTS: SIGNIFICANT CHANGE UP
CULTURE RESULTS: SIGNIFICANT CHANGE UP
SPECIMEN SOURCE: SIGNIFICANT CHANGE UP
SPECIMEN SOURCE: SIGNIFICANT CHANGE UP

## 2021-01-11 ENCOUNTER — APPOINTMENT (OUTPATIENT)
Dept: ORTHOPEDIC SURGERY | Facility: CLINIC | Age: 51
End: 2021-01-11

## 2021-03-17 NOTE — ED ADULT NURSE NOTE - DISCHARGE DATE/TIME
Patient daughter called states the patient was just discharged from the hospital yesterday daughter states that the patient is very agitated and need something to calm her like Seroquel. Patient daughter would like you to call her to discuss other concerns she has about the patient .    28-Dec-2020 18:55

## 2021-07-23 NOTE — ED ADULT TRIAGE NOTE - CHIEF COMPLAINT QUOTE
Patient requesting both orders sent to [facility] booked until 9/14 for Echo     Fax# [phone]   ·	MISA  ·	c diff  ·	s/p liver transplant      Plan:  diarrhea resolved  cont po vanco per ID   diet as tolerated  HD per renal   lfts improving   ct noted   care per transplant services greatly appreciated  " I have burning on urination and urinating frequently x 4 days "

## 2021-09-10 NOTE — PHYSICAL EXAM
[de-identified] : Left Knee: No obvious deformities, atrophy, ecchymosis, or swelling/effusion. Negative tenderness to medial/lateral joint lines, patella, popliteal fossa, distal quadriceps. patellar tendon, or tibial tubercle. Normal active and passive range of motion, including flexion to 130 degrees and extension to 0 degrees. Negative Anterior/Posterior Drawer, Lachman's, Javier's, Pivot Shift, and Valgus/Varus Stress tests. Neurovascular status is intact.\par \par Otherwise, no apparent distress. Alert and oriented x 3. Normal mood and affect. No atrophy or swelling. 5 out of 5 motor strength. Sensation is intact and symmetrical. Normal deep tendon reflexes. Full range of motion of shoulder, elbows, hips, and knees (flexion, extension, and rotation). No palpatory tenderness or palpable lymph nodes. Negative straight leg raise. Normal finger-to-nose test. No pathological reflexes. Normal ambulation. No upper or lower extremity instability. [de-identified] : # views of ? are negative for any obvious fractures or dislocations. The patient understands that this is a wet read and I am not a radiologist. If the final read reveals something different than discussed in the office, then the patient will get a call back in the next 24 - 48 hours to discuss.\par

## 2021-09-10 NOTE — DISCUSSION/SUMMARY
[de-identified] : Diagnosis\par Medication Rx?\par Physical Therapy ordered?\par MRI ordered?\par Brace/splint/cast/crutches given?\par Cortisone injection or aspiration done?\par Pain management referral?\par Follow up with  ?\par All options discussed with patient.\par All questions by patient answered to their satisfaction.\par Patient expresses full understanding and agreement with plan.\par Patient is happy with the office visit.\par

## 2021-09-10 NOTE — HISTORY OF PRESENT ILLNESS
[de-identified] : Mr. OWEN BRUNNER is a 50 year male who presents to office complaining of left knee pain.\par Patient has h/o right knee arthroscopy in 2017 by Dr. Akbar, which is not giving him any pain at this time.\par \par All review of systems, family history, social history, surgical history, past medical history, medications, and allergies not previously stated as positive are negative. They were reviewed by me today with the patient and documented accordingly.

## 2021-09-11 ENCOUNTER — APPOINTMENT (OUTPATIENT)
Dept: ORTHOPEDIC SURGERY | Facility: CLINIC | Age: 51
End: 2021-09-11
Payer: MEDICAID

## 2021-09-18 ENCOUNTER — APPOINTMENT (OUTPATIENT)
Dept: ORTHOPEDIC SURGERY | Facility: CLINIC | Age: 51
End: 2021-09-18
Payer: MEDICAID

## 2021-09-18 VITALS
BODY MASS INDEX: 27.32 KG/M2 | HEIGHT: 66 IN | WEIGHT: 170 LBS | SYSTOLIC BLOOD PRESSURE: 148 MMHG | DIASTOLIC BLOOD PRESSURE: 98 MMHG | HEART RATE: 84 BPM

## 2021-09-18 DIAGNOSIS — Z78.9 OTHER SPECIFIED HEALTH STATUS: ICD-10-CM

## 2021-09-18 DIAGNOSIS — Z87.442 PERSONAL HISTORY OF URINARY CALCULI: ICD-10-CM

## 2021-09-18 DIAGNOSIS — M25.562 PAIN IN LEFT KNEE: ICD-10-CM

## 2021-09-18 DIAGNOSIS — Z86.79 PERSONAL HISTORY OF OTHER DISEASES OF THE CIRCULATORY SYSTEM: ICD-10-CM

## 2021-09-18 PROCEDURE — 73564 X-RAY EXAM KNEE 4 OR MORE: CPT | Mod: LT

## 2021-09-18 PROCEDURE — 99203 OFFICE O/P NEW LOW 30 MIN: CPT

## 2021-09-18 RX ORDER — LISINOPRIL 30 MG/1
TABLET ORAL
Refills: 0 | Status: ACTIVE | COMMUNITY

## 2021-09-18 NOTE — DISCUSSION/SUMMARY
[de-identified] : Options discussed,  including conservative management such as physical therapy, injections, anti-inflammatory medications. We will obtain an MRI to further evaluate his medial meniscus due to his injury. Recommend followup with Dr. Akbar after MRI. All questions answered

## 2021-09-18 NOTE — HISTORY OF PRESENT ILLNESS
[de-identified] : 50-year-old male presents complaining of left medial knee pain for about 3 weeks. His playing soccer, twisted his knee felt a popping sensation medial knee with immediate pain. He states he's been limping since then. This was started feeling better a few days ago. Overall he is able to slightly walk better. He reports pain mainly with walking. He tried ice, rest. Denies numbness tingling\par \par The patient's past medical history, past surgical history, medications, allergies, and social history were reviewed by me today with the patient and documented accordingly. In addition, the patient's family history, which is noncontributory to this visit, was also reviewed.\par

## 2021-09-18 NOTE — PHYSICAL EXAM
[de-identified] : General Exam\par \par Well developed, well nourished\par No apparent distress\par Oriented to person, place, and time\par Mood: Normal\par Affect: Normal\par Balance and coordination: Normal\par Gait: Normal\par \par left knee exam\par \par Skin: Clean, dry, intact\par Inspection: No obvious malalignment, no masses, no swelling, no effusion.\par Tenderness: + MJLT. No LJLT. No tenderness over the medial and lateral patella facets. No ttp medial/lateral epicondyle, patella tendon, tibial tubercle, pes anserinus, or proximal fibula.\par ROM: 0 to 130° no pain with deep flexion in both knees\par Stability: Stable to varus, valgus, lachman testing. Negative anterior/posterior drawer.\par Additional tests: Negative McMurrays test, Negative patellar grind test. \par Strength: 5/5 Q/H/TA/GS/EHL, no atrophy\par Neuro: In tact to light touch throughout in dp/sp/tib/sharon/saph nerve districutions, DTR's normal\par Pulses: 2+ DP/PT pulses.\par  [de-identified] : 4 views L knee obtained.  Preliminary report- no acute fx/dislocation noted.  no OA

## 2021-09-21 PROBLEM — M25.562 LEFT KNEE PAIN: Status: ACTIVE | Noted: 2021-09-10

## 2021-10-06 ENCOUNTER — OUTPATIENT (OUTPATIENT)
Dept: OUTPATIENT SERVICES | Facility: HOSPITAL | Age: 51
LOS: 1 days | End: 2021-10-06
Payer: MEDICAID

## 2021-10-06 ENCOUNTER — APPOINTMENT (OUTPATIENT)
Dept: MRI IMAGING | Facility: CLINIC | Age: 51
End: 2021-10-06
Payer: MEDICAID

## 2021-10-06 DIAGNOSIS — Z98.890 OTHER SPECIFIED POSTPROCEDURAL STATES: Chronic | ICD-10-CM

## 2021-10-06 DIAGNOSIS — M25.562 PAIN IN LEFT KNEE: ICD-10-CM

## 2021-10-06 DIAGNOSIS — Z00.8 ENCOUNTER FOR OTHER GENERAL EXAMINATION: ICD-10-CM

## 2021-10-06 PROCEDURE — 73721 MRI JNT OF LWR EXTRE W/O DYE: CPT

## 2021-10-06 PROCEDURE — 73721 MRI JNT OF LWR EXTRE W/O DYE: CPT | Mod: 26,LT

## 2021-10-25 ENCOUNTER — APPOINTMENT (OUTPATIENT)
Dept: ORTHOPEDIC SURGERY | Facility: CLINIC | Age: 51
End: 2021-10-25

## 2021-10-25 ENCOUNTER — APPOINTMENT (OUTPATIENT)
Dept: ORTHOPEDIC SURGERY | Facility: CLINIC | Age: 51
End: 2021-10-25
Payer: MEDICAID

## 2021-10-25 DIAGNOSIS — S83.232D COMPLEX TEAR OF MEDIAL MENISCUS, CURRENT INJURY, LEFT KNEE, SUBSEQUENT ENCOUNTER: ICD-10-CM

## 2021-10-25 PROCEDURE — 99213 OFFICE O/P EST LOW 20 MIN: CPT

## 2021-11-10 ENCOUNTER — OUTPATIENT (OUTPATIENT)
Dept: OUTPATIENT SERVICES | Facility: HOSPITAL | Age: 51
LOS: 1 days | End: 2021-11-10
Payer: MEDICAID

## 2021-11-10 DIAGNOSIS — Z98.890 OTHER SPECIFIED POSTPROCEDURAL STATES: Chronic | ICD-10-CM

## 2021-11-10 DIAGNOSIS — S83.232D COMPLEX TEAR OF MEDIAL MENISCUS, CURRENT INJURY, LEFT KNEE, SUBSEQUENT ENCOUNTER: ICD-10-CM

## 2021-11-10 LAB
ANION GAP SERPL CALC-SCNC: 13 MMOL/L — SIGNIFICANT CHANGE UP (ref 7–14)
BASOPHILS # BLD AUTO: 0.03 K/UL — SIGNIFICANT CHANGE UP (ref 0–0.2)
BASOPHILS NFR BLD AUTO: 0.3 % — SIGNIFICANT CHANGE UP (ref 0–2)
BUN SERPL-MCNC: 13 MG/DL — SIGNIFICANT CHANGE UP (ref 7–23)
CALCIUM SERPL-MCNC: 10 MG/DL — SIGNIFICANT CHANGE UP (ref 8.4–10.5)
CHLORIDE SERPL-SCNC: 101 MMOL/L — SIGNIFICANT CHANGE UP (ref 98–107)
CO2 SERPL-SCNC: 26 MMOL/L — SIGNIFICANT CHANGE UP (ref 22–31)
CREAT SERPL-MCNC: 0.96 MG/DL — SIGNIFICANT CHANGE UP (ref 0.5–1.3)
EOSINOPHIL # BLD AUTO: 0.11 K/UL — SIGNIFICANT CHANGE UP (ref 0–0.5)
EOSINOPHIL NFR BLD AUTO: 1.2 % — SIGNIFICANT CHANGE UP (ref 0–6)
GLUCOSE SERPL-MCNC: 103 MG/DL — HIGH (ref 70–99)
HCT VFR BLD CALC: 47.2 % — SIGNIFICANT CHANGE UP (ref 39–50)
HGB BLD-MCNC: 16.4 G/DL — SIGNIFICANT CHANGE UP (ref 13–17)
IANC: 5.96 K/UL — SIGNIFICANT CHANGE UP (ref 1.5–8.5)
IMM GRANULOCYTES NFR BLD AUTO: 0.3 % — SIGNIFICANT CHANGE UP (ref 0–1.5)
LYMPHOCYTES # BLD AUTO: 2 K/UL — SIGNIFICANT CHANGE UP (ref 1–3.3)
LYMPHOCYTES # BLD AUTO: 22.6 % — SIGNIFICANT CHANGE UP (ref 13–44)
MCHC RBC-ENTMCNC: 29.4 PG — SIGNIFICANT CHANGE UP (ref 27–34)
MCHC RBC-ENTMCNC: 34.7 GM/DL — SIGNIFICANT CHANGE UP (ref 32–36)
MCV RBC AUTO: 84.7 FL — SIGNIFICANT CHANGE UP (ref 80–100)
MONOCYTES # BLD AUTO: 0.72 K/UL — SIGNIFICANT CHANGE UP (ref 0–0.9)
MONOCYTES NFR BLD AUTO: 8.1 % — SIGNIFICANT CHANGE UP (ref 2–14)
NEUTROPHILS # BLD AUTO: 5.96 K/UL — SIGNIFICANT CHANGE UP (ref 1.8–7.4)
NEUTROPHILS NFR BLD AUTO: 67.5 % — SIGNIFICANT CHANGE UP (ref 43–77)
NRBC # BLD: 0 /100 WBCS — SIGNIFICANT CHANGE UP
NRBC # FLD: 0 K/UL — SIGNIFICANT CHANGE UP
PLATELET # BLD AUTO: 402 K/UL — HIGH (ref 150–400)
POTASSIUM SERPL-MCNC: 3.5 MMOL/L — SIGNIFICANT CHANGE UP (ref 3.5–5.3)
POTASSIUM SERPL-SCNC: 3.5 MMOL/L — SIGNIFICANT CHANGE UP (ref 3.5–5.3)
RBC # BLD: 5.57 M/UL — SIGNIFICANT CHANGE UP (ref 4.2–5.8)
RBC # FLD: 12.1 % — SIGNIFICANT CHANGE UP (ref 10.3–14.5)
SODIUM SERPL-SCNC: 140 MMOL/L — SIGNIFICANT CHANGE UP (ref 135–145)
WBC # BLD: 8.85 K/UL — SIGNIFICANT CHANGE UP (ref 3.8–10.5)
WBC # FLD AUTO: 8.85 K/UL — SIGNIFICANT CHANGE UP (ref 3.8–10.5)

## 2021-11-10 PROCEDURE — 93010 ELECTROCARDIOGRAM REPORT: CPT

## 2021-11-22 ENCOUNTER — TRANSCRIPTION ENCOUNTER (OUTPATIENT)
Age: 51
End: 2021-11-22

## 2021-11-22 VITALS
HEART RATE: 81 BPM | HEIGHT: 66 IN | WEIGHT: 175.93 LBS | RESPIRATION RATE: 16 BRPM | DIASTOLIC BLOOD PRESSURE: 102 MMHG | OXYGEN SATURATION: 99 % | SYSTOLIC BLOOD PRESSURE: 134 MMHG | TEMPERATURE: 98 F

## 2021-11-23 ENCOUNTER — APPOINTMENT (OUTPATIENT)
Dept: ORTHOPEDIC SURGERY | Facility: AMBULATORY SURGERY CENTER | Age: 51
End: 2021-11-23

## 2021-11-23 ENCOUNTER — OUTPATIENT (OUTPATIENT)
Dept: OUTPATIENT SERVICES | Facility: HOSPITAL | Age: 51
LOS: 1 days | Discharge: HOME | End: 2021-11-23
Payer: MEDICAID

## 2021-11-23 VITALS — SYSTOLIC BLOOD PRESSURE: 129 MMHG | HEART RATE: 80 BPM | DIASTOLIC BLOOD PRESSURE: 85 MMHG

## 2021-11-23 DIAGNOSIS — S83.232D COMPLEX TEAR OF MEDIAL MENISCUS, CURRENT INJURY, LEFT KNEE, SUBSEQUENT ENCOUNTER: ICD-10-CM

## 2021-11-23 DIAGNOSIS — Z98.890 OTHER SPECIFIED POSTPROCEDURAL STATES: Chronic | ICD-10-CM

## 2021-11-23 PROCEDURE — 29881 ARTHRS KNE SRG MNISECTMY M/L: CPT | Mod: LT

## 2021-11-23 RX ORDER — SODIUM CHLORIDE 9 MG/ML
1000 INJECTION, SOLUTION INTRAVENOUS
Refills: 0 | Status: DISCONTINUED | OUTPATIENT
Start: 2021-11-23 | End: 2021-12-07

## 2021-11-23 RX ORDER — LISINOPRIL 2.5 MG/1
1 TABLET ORAL
Qty: 0 | Refills: 0 | DISCHARGE

## 2021-11-23 RX ORDER — OXYCODONE HYDROCHLORIDE 5 MG/1
1 TABLET ORAL
Qty: 12 | Refills: 0
Start: 2021-11-23 | End: 2021-11-25

## 2021-11-23 RX ORDER — LOSARTAN POTASSIUM 100 MG/1
1 TABLET, FILM COATED ORAL
Qty: 0 | Refills: 0 | DISCHARGE

## 2021-11-23 NOTE — ASU DISCHARGE PLAN (ADULT/PEDIATRIC) - CARE PROVIDER_API CALL
Gerard Akbar)  Orthopaedic Sports Medicine; Orthopaedic Surgery  [address] [place] [address]  Phone: [phone]  Fax: [phone]  Follow Up Time:

## 2021-11-23 NOTE — ASU PREOP CHECKLIST - TAMPON REMOVED
ROUNDS

PT RESTING WELL, FAIRLY ASLEEP WITH RESPIRATIONS EVEN AND UNLABORED. NO DISTRESS NOTED. KEPT 
UNDISTURBED FOR NOW. WILL MONITOR PT. CALL LIGHT WITHIN REACH. SPOUSE ASLEEP AT BEDSIDE. n/a

## 2021-12-03 ENCOUNTER — APPOINTMENT (OUTPATIENT)
Dept: ORTHOPEDIC SURGERY | Facility: CLINIC | Age: 51
End: 2021-12-03
Payer: MEDICAID

## 2021-12-03 VITALS — BODY MASS INDEX: 27.32 KG/M2 | WEIGHT: 170 LBS | HEIGHT: 66 IN

## 2021-12-03 DIAGNOSIS — Z98.890 OTHER SPECIFIED POSTPROCEDURAL STATES: ICD-10-CM

## 2021-12-03 PROCEDURE — 99024 POSTOP FOLLOW-UP VISIT: CPT

## 2021-12-25 NOTE — H&P ADULT - PROBLEM SELECTOR PLAN 7
Walk in
- Pharm VTE ppx with lovenox 40mg sq daily    IMPROVE VTE Individual Risk Assessment          RISK                                                          Points  [  ] Previous VTE                                                3  [  ] Thrombophilia                                             2  [  ] Lower limb paralysis                                   2        (unable to hold up >15 seconds)    [  ] Current Cancer                                             2         (within 6 months)  [  ] Immobilization > 24 hrs                              1  [  ] ICU/CCU stay > 24 hours                             1  [  ] Age > 60                                                         1    IMPROVE VTE Score: 0

## 2022-01-13 NOTE — BRIEF OPERATIVE NOTE - ESTIMATED BLOOD LOSS
0 Mohs Histo Method Verbiage: Each section was then chromacoded and processed in the Mohs lab using the Mohs protocol and submitted for frozen section.

## 2022-11-30 NOTE — ED PROVIDER NOTE - CPE EDP CARDIAC NORM
[facility] Medicine  History & Physical    Patient Name: Brunilda Bob  MRN: [id number]  Patient Class: OP- Observation  Admission Date: 11/29/2022  Attending Physician: Nida Linder MD   Primary Care Provider: Sadiq Titus MD         Patient information was obtained from patient, relative(s), past medical records and ER records.     Subjective:     Principal Problem:Acute blood loss anemia    Chief Complaint:   Chief Complaint   Patient presents with    Bleeding/Bruising     Had EUS and bleeding, admit for monitoring        HPI: Mrs. Spann is a 60-year-old female who is being directly admitted after EUS performed earlier today.  Patient with recent [facility] admission, CTA at that time with right lower lung subsegmental PE, as well as 4.8 x 4.1 x 3.7 cm body of pancreas mass with numerous hepatic lesions concerning for metastatic disease, CA 19.9 and alpha-fetoprotein within normal, acute on chronic anemia with concern for upper GI bleed, underwent EGD with concern for to duodenal ulcer, recommends b.i.d. PPI therapy by 1 month with outpatient EUS.  Patient was started on Eliquis for PE during that admission, seen by Dr. Toribio from Hematology/Oncology.  She had EUS today round mass pancreatic body 3.6 x 3.7 mm, FNA, noted to have bleeding postprocedure, believed to be coming from pancreatic duct.  Last dose of Eliquis was on 11/27 morning.  She was given sub cut vitamin K 10 mg.  Hemoglobin 7.2, ordered 1 unit PRBC transfusion.  Patient does report generalized abdominal pain, states she takes Dilaudid q.4 hours at home.  States she has not had a bowel movement since previous admission, states she is passing flatus, was using Colace at home, feels related to pain medication.  Mother had history of colorectal cancer and son history of testicular cancer.  Nonsmoker only occasional alcohol use.  Does report intermittent swelling left ankle with calf cramping sensation.   Afebrile.  Previous imaging reviewed.  Discussed with referring provider .  Discussed with nursing.  Discussed with patient and family members at bedside.      Past Medical History:   Diagnosis Date    Broken heart syndrome     Hypertension        Past Surgical History:   Procedure Laterality Date    CLAVICLE SURGERY  2012    ESOPHAGOGASTRODUODENOSCOPY N/A 11/23/2022    Procedure: EGD (ESOPHAGOGASTRODUODENOSCOPY);  Surgeon: Aries Saucedo III, MD;  [facility];  Service: Endoscopy;  Laterality: N/A;    TONSILLECTOMY         Review of patient's allergies indicates:   Allergen Reactions    Crestor [rosuvastatin] Other (See Comments)     Paralysis    Codeine Nausea And Vomiting    Strawberries [strawberry] Hives    Adhesive tape-silicones Rash       No current facility-administered medications on file prior to encounter.     Current Outpatient Medications on File Prior to Encounter   Medication Sig    albuterol (PROVENTIL/VENTOLIN HFA) 90 mcg/actuation inhaler Inhale 1-2 puffs into the lungs every 4 (four) hours as needed.    apixaban (ELIQUIS) 2.5 mg Tab Take 4 tablets (10 mg total) by mouth 2 (two) times daily for 10 days, THEN 2 tablets (5 mg total) 2 (two) times daily.    atorvastatin (LIPITOR) 10 MG tablet Take 1 tablet (10 mg total) by mouth every evening.    esomeprazole (NEXIUM) 40 MG capsule Take 1 capsule (40 mg total) by mouth 2 (two) times daily.    levothyroxine (UNITHROID) 50 MCG tablet Take 50 mcg by mouth every evening. UNITHROID    lipase-protease-amylase 12,000-38,000-60,000 units (CREON) CpDR Take 2 po with meals.  Take 1 po with snacks.    sucralfate (CARAFATE) 1 gram tablet Take 1 tablet (1 g total) by mouth 2 (two) times daily.    [DISCONTINUED] atenoloL (TENORMIN) 25 MG tablet TAKE ONE TABLET BY MOUTH ONCE DAILY (Patient taking differently: Take 25 mg by mouth every evening.)     Family History       Problem Relation (Age of Onset)    Colon cancer Mother    Hypertension  Father    Testicular cancer Son    Uterine cancer Mother          Tobacco Use    Smoking status: Never    Smokeless tobacco: Never   Substance and Sexual Activity    Alcohol use: Yes     Comment: occ    Drug use: Never    Sexual activity: Yes     Partners: Male     Review of Systems   Constitutional:  Negative for fever.   HENT:  Negative for congestion.    Cardiovascular:  Positive for leg swelling.   Gastrointestinal:  Positive for abdominal pain and constipation. Negative for blood in stool.   Genitourinary:  Negative for hematuria.   Musculoskeletal:  Negative for neck stiffness.   Skin:  Negative for wound.   Allergic/Immunologic: Negative for immunocompromised state.   Neurological:  Negative for seizures.   Psychiatric/Behavioral:  Negative for confusion.    Objective:     Vital Signs (Most Recent):  Temp: 98.2 °F (36.8 °C) (11/29/22 1927)  Pulse: 74 (11/29/22 1927)  Resp: 18 (11/29/22 1927)  BP: 121/70 (11/29/22 1927)  SpO2: 98 % (11/29/22 1927) Vital Signs (24h Range):  Temp:  [98.1 °F (36.7 °C)-98.6 °F (37 °C)] 98.2 °F (36.8 °C)  Pulse:  [69-82] 74  Resp:  [11-18] 18  SpO2:  [95 %-100 %] 98 %  BP: ()/(53-76) 121/70     Weight: 76.2 kg (167 lb 15.9 oz)  Body mass index is 32.81 kg/m².    Physical Exam  Vitals and nursing note reviewed.   Constitutional:       General: She is not in acute distress.     Appearance: She is not ill-appearing, toxic-appearing or diaphoretic.   HENT:      Head: Normocephalic and atraumatic.      Mouth/Throat:      Mouth: Mucous membranes are moist.   Eyes:      General:         Right eye: No discharge.         Left eye: No discharge.   Cardiovascular:      Rate and Rhythm: Normal rate and regular rhythm.      Comments: Trace left ankle edema  Pulmonary:      Effort: No respiratory distress.      Breath sounds: Normal breath sounds. No stridor. No wheezing.      Comments: On room air  Abdominal:      General: Bowel sounds are normal. There is no distension.      Tenderness:  There is abdominal tenderness.   Genitourinary:     Comments: No Campo catheter  Skin:     General: Skin is warm.      Comments: Bruising left antecubital fossa   Neurological:      General: No focal deficit present.      Mental Status: She is alert and oriented to person, place, and time. Mental status is at baseline.   Psychiatric:         Mood and Affect: Mood normal.           Significant Labs: BMP: No results for input(s): GLU, NA, K, CL, CO2, BUN, CREATININE, CALCIUM, MG in the last 48 hours.  CBC:   Recent Labs   Lab 11/29/22  1414   WBC 13.22*   HGB 7.2*   HCT 22.4*        CMP: No results for input(s): NA, K, CL, CO2, GLU, BUN, CREATININE, CALCIUM, PROT, ALBUMIN, BILITOT, ALKPHOS, AST, ALT, ANIONGAP, EGFRNONAA in the last 48 hours.    Invalid input(s): ESTGFAFRICA  Cardiac Markers: No results for input(s): CKMB, MYOGLOBIN, BNP, TROPISTAT in the last 48 hours.  Lactic Acid: No results for input(s): LACTATE in the last 48 hours.  Magnesium: No results for input(s): MG in the last 48 hours.  POCT Glucose: No results for input(s): POCTGLUCOSE in the last 48 hours.  Respiratory Culture: No results for input(s): GSRESP, RESPIRATORYC in the last 48 hours.  Troponin: No results for input(s): TROPONINI, TROPONINIHS in the last 48 hours.  TSH:   Recent Labs   Lab 11/23/22  0900   TSH 1.430     Urine Culture: No results for input(s): LABURIN in the last 48 hours.  Urine Studies: No results for input(s): COLORU, APPEARANCEUA, PHUR, SPECGRAV, PROTEINUA, GLUCUA, KETONESU, BILIRUBINUA, OCCULTUA, NITRITE, UROBILINOGEN, LEUKOCYTESUR, RBCUA, WBCUA, BACTERIA, SQUAMEPITHEL, HYALINECASTS in the last 48 hours.    Invalid input(s): WRIGHTSUR    Significant Imaging: I have reviewed all pertinent imaging results/findings within the past 24 hours.    X-Ray Chest PA And Lateral    Result Date: 11/18/2022  Reason: Cough and congestion FINDINGS: PA and lateral chest with comparison chest x-ray June 30, 2022 show normal  cardiomediastinal silhouette. Lungs are clear. Pulmonary vasculature is normal. No acute osseous abnormality. IMPRESSION: No acute cardiopulmonary abnormality. Electronically signed by:  Bryn Dempsey DO  11/18/2022 12:45 PM CST Workstation: [id number]    CT Head Without Contrast    Result Date: 11/23/2022  All CT scans at this facility used dose modulation, iterative reconstruction and/or weight-based dosing when appropriate to reduce radiation doses  as low as reasonably achievable. CLINICAL INFORMATION:  Dizziness, persistent/recurrent, cardiac or vascular cause suspected COMPARISON: 10/30/2019 FINDINGS:   The ventricles and sulci are normal in size and configuration for age.  There is no intraparenchymal hemorrhage, mass or midline shift.  There are no extra-axial fluid collections. There is minimal mucosal thickening in the maxillary sinuses. The remainder the paranasal sinuses are clear. IMPRESSION: No acute intracranial process These findings were given to Dr. beach at 10:25 AM Electronically signed by:  Inna Pratt MD  11/23/2022 10:35 AM CST Workstation: [id number]    CTA Chest Non-Coronary (PE Studies)    Result Date: 11/23/2022   ADDENDUM #1 This is a correction to the prior report There are emboli within subsegmental arteries in the right lower lobe. These findings were called to Dr. Vazquez in the emergency department at 11:27 AM Electronically signed by:  Inna Pratt MD  11/23/2022 11:28 AM CST Workstation: [id number]  ORIGINAL REPORT All CT scans at this facility used dose modulation, iterative reconstruction and/or weight-based dosing when appropriate to reduce radiation doses  as low as reasonably achievable. HISTORY: Chest pain, loss of consciousness. FINDINGS: Thin axial imaging through the chest was performed with 100 mL Omnipaque 350 IV contrast, with sagittal and coronal reformatted images and 3-D reconstructions performed on an independent workstation, with images stored in the  patient's permanent electronic medical record. This is a high-grade quality study for the evaluation of pulmonary embolism. The pulmonary arteries are normal in appearance without pulmonary emboli noted up to the subsegmental level, noting limitations of CT technique for identifying small isolated subsegmental emboli. The heart is normal in size. 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 There is no pericardial effusion. The aorta is normal in caliber. There is no hilar, mediastinal or axillary adenopathy. There are no pulmonary nodules, infiltrates or pleural effusions. The trachea and bronchi are normal. There are no acute osseous abnormalities. Evaluation of the upper abdomen demonstrates multiple hypodense masses throughout both lobes of the liver suspicious for metastatic disease.. There is a 4.5 x 3.2 cm mass arising from the body of the pancreas suspicious for malignancy. Please see the CT abdomen report. The adrenal glands are normal. IMPRESSION: No CT evidence pulmonary emboli No acute pulmonary process 4.5 x 3.2 cm mass arising from the proximal body of the pancreas abutting the proximal splenic artery and hepatic artery suspicious for pancreatic malignancy Numerous hypodense masses throughout the liver compatible with metastatic disease Electronically signed by:  Inna Prtat MD  11/23/2022 10:47 AM CST Workstation: [id number]    CT Abdomen Pelvis With Contrast    Result Date: 11/23/2022  All CT scans at this facility used dose modulation, iterative reconstruction and/or weight-based dosing when appropriate to reduce radiation doses  as low as reasonably achievable. HISTORY: Liver mass, abdominal pain FINDINGS: Axial postcontrast imaging was performed with 100 mL Omnipaque 350 IV contrast . Multiphase imaging through the liver. COMPARISON: 10/30/2019 CT ABDOMEN: The lung bases are clear. There is a 2.4 cm pseudoaneurysm at the ventricular apex Liver: There are numerous  hypodense masses throughout both lobes of the liver compatible with metastatic disease. The largest is within segment five measuring 3.6 x 3.5 cm. The largest lesion within the lateral segment the left lobe measures 2 cm. There are multiple cysts within both lobes of the liver. There is no biliary duct dilatation. There is a 4.5 x 4.1 x 3.7 cm (AP, craniocaudal, transverse mass arising from the proximal body of the pancreas extending superiorly. The mass abuts the proximal splenic artery and common hepatic artery. The vessels are patent. Findings are compatible with pancreatic malignancy. There is dilatation of the distal pancreatic duct. The superior mesenteric artery and vein are patent The spleen, gallbladder, and adrenal glands are normal. The kidneys enhance symmetrically without hydronephrosis or calculi. There is contrast material within the collecting system and ureters. There are no thick-walled or dilated bowel loops. There is no mesenteric or retroperitoneal adenopathy. Aorta is normal in caliber. The musculature is normal. CT PELVIS: The bladder is normal. There are multiple uterine fibroids the largest measuring 2.8 cm on the left. There is no pelvic adenopathy. There are no acute osseous abnormalities. IMPRESSION: 4.8 x 4.1 x 3.7 cm heterogeneous mass arising from the proximal body of the pancreas resulting in dilatation of the pancreatic duct compatible with pancreatic malignancy. Numerous hepatic metastasis with minimal ascites around the liver Electronically signed by:  Inna Pratt MD  11/23/2022 11:05 AM CST Workstation: [id number]    X-Ray Chest AP Portable    Result Date: 11/23/2022  XR CHEST 1 VIEW CLINICAL HISTORY: 59 years Female Chest Pain COMPARISON: 11/18/2022 FINDINGS: Cardiomediastinal silhouette is within normal limits. Lungs are normally expanded with no airspace consolidation. No pleural effusion or pneumothorax. No acute osseous abnormality. IMPRESSION: No acute pulmonary  process. Electronically signed by:  Inna Pratt MD  11/23/2022 9:04 AM CST Workstation: [id number]       Assessment/Plan:     Active Hospital Problems    Diagnosis    *Acute blood loss anemia    Upper GI bleed    Pancreatic mass    Hypothyroidism    Current use of long term anticoagulation    Liver lesion, multiple     Abdominal pain    PE o right lower lung subsegmental artery    Dyslipidemia    Hypertension     Plan:  Admit observation, medical floor  Ensure patient has 2 large-bore peripheral IV   Trending H&H q.6 hours   Type and screen ordered, transfuse 1 unit of PRBC, further transfusions hemoglobin less than 7   Last dose Eliquis 11/2 7, status post 10 mg vitamin K, holding anticoagulation and monitoring   CT triple phase pancreatic protocol ordered   Ultrasound lower extremity evaluate DVT given history of PE and holding anticoagulation, if significant clot burden will need to consider IVC   P.r.n. pain control as ordered, adjust as needed  Scheduling stool softeners/bowel regiment and following  Resume oral diet  Electrolytes sliding scale repletion  A.m. labs ordered  Appreciate Gastroenterology input   Consult Oncology, family states they have had good relationship with Dr. Caldera   Further plan as per clinical course    VTE Risk Mitigation (From admission, onward)           Ordered     IP VTE HIGH RISK PATIENT  Once         11/29/22 1924     Place sequential compression device  Until discontinued         11/29/22 1924                       Nida Linder MD  Department of Hospital Medicine   [facility]   normal...

## 2023-02-07 NOTE — ASU PREOP CHECKLIST - STERILIZATION AFFIRMATION
normal mood with appropriate affect  , good eye contact, speech clear , normal mood with appropriate affect  , good eye contact, speech clear n/a

## 2023-06-05 NOTE — H&P PST ADULT - HISTORY OF PRESENT ILLNESS
Name from pharmacy: SAXENDA 6MG/ML PEN INJ 3ML         Will file in chart as: Saxenda 18 MG/3ML pen-injector     Possible duplicate: Aren to review recent actions on this medication    Sig: START INJECTING 0.6 MG EVERY DAY FOR 1 WEEK THEN INCREASE DOSE BY 0.6MG WEEKLY UNTIL 3 MG DAILY    Disp:  15 mL    Refills:  1 (Pharmacy requested: Not specified)    Start: 6/5/2023    Class: Eprescribe    Non-formulary For: Morbid obesity with BMI of 40.0-44.9, adult (CMD)    Last ordered: 2 months ago by Brian Ha PA-C Last refill: 5/6/2023    Rx #: [id number]       To be filled at: [facility][id number] [address] AT [address]        47 y/o male with no PMH. Presents to PST with a preop dx of other tear of medial meniscus, current injury, rt knee, subsequent encounter and to be evaluated for a scheduled right knee arthroscopic partial medial meniscectomy on 3/14/17. Pt states last July 2017 was practicing soccer and started feeling pain on his rt knee, did not recall any injury while playing. Went to an [facility] where an X-ray was done and was told no fracture. Pt then went to see Dr Akbar who ordered an MRI and revealed a meniscus tear, PT ordered but did not helped symptoms. Pt recommended surgical intervention at this time. 45 y/o male with no PMH. Presents to PST with a preop dx of other tear of medial meniscus, current injury, rt knee, subsequent encounter and to be evaluated for a scheduled right knee arthroscopic partial medial meniscectomy on 3/14/17. Pt states last July 2017 was practicing soccer and started feeling pain on his rt knee, did not recall any injury while playing. Went to an [facility] where an X-ray was done and was told no fracture. Pt then went to see Dr Akbar who ordered an MRI and revealed a meniscus tear, Physical Therapy ordered but did not helped symptoms. Pt recommended surgical intervention at this time.

## 2023-06-07 NOTE — H&P ADULT - NSHPOUTPATIENTPROVIDERS_GEN_ALL_CORE
BBR, skin and breast tissue excised, Wise pattern closure BBR, skin and breast tissue excised, SM pedicles, Wise pattern closure PCP: Dr. Smith

## 2023-07-27 ENCOUNTER — APPOINTMENT (OUTPATIENT)
Dept: ORTHOPEDIC SURGERY | Facility: CLINIC | Age: 53
End: 2023-07-27
Payer: MEDICAID

## 2023-07-27 VITALS — HEIGHT: 66 IN | WEIGHT: 170 LBS | BODY MASS INDEX: 27.32 KG/M2

## 2023-07-27 DIAGNOSIS — M25.511 PAIN IN RIGHT SHOULDER: ICD-10-CM

## 2023-07-27 DIAGNOSIS — M75.42 IMPINGEMENT SYNDROME OF LEFT SHOULDER: ICD-10-CM

## 2023-07-27 DIAGNOSIS — M67.911 UNSPECIFIED DISORDER OF SYNOVIUM AND TENDON, RIGHT SHOULDER: ICD-10-CM

## 2023-07-27 DIAGNOSIS — M75.41 IMPINGEMENT SYNDROME OF RIGHT SHOULDER: ICD-10-CM

## 2023-07-27 DIAGNOSIS — M67.912 UNSPECIFIED DISORDER OF SYNOVIUM AND TENDON, LEFT SHOULDER: ICD-10-CM

## 2023-07-27 PROCEDURE — 99213 OFFICE O/P EST LOW 20 MIN: CPT

## 2023-07-27 PROCEDURE — 73030 X-RAY EXAM OF SHOULDER: CPT | Mod: 50

## 2023-08-07 NOTE — ED PROVIDER NOTE - CPE EDP SKIN NORM
Airway    Performed by: Katherine Rosales MD  Authorized by: Katherine Rosales MD    Final Airway Type:  Endotracheal airway  Final Endotracheal Airway*:  ETT  ETT Size (mm)*:  7.0  Technique Used for Successful ETT Placement:  Direct laryngoscopy  Devices/Methods Used in Placement*:  Mask  Intubation Procedure*:  ETCO2, Atraumatic and Dentition Unchanged  Blade Type*:  Velasquez  Blade Size*:  2   Patient Identified, Procedure confirmed, Emergency equipment available and Safety protocols followed  Location:  OR  Indications for Airway Management:  Anesthesia  Start Time: 8/7/2023 12:18 PM      
normal...

## 2023-09-25 NOTE — H&P PST ADULT - PROBLEM/PLAN-1
Attending to bill Attending to bill Attending to bill Attending to bill Attending to bill Attending to bill Attending to bill DISPLAY PLAN FREE TEXT

## 2024-01-01 NOTE — ASU PREOPERATIVE ASSESSMENT, ADULT (IPARK ONLY) - BP NONINVASIVE SYSTOLIC (MM HG)
Called mom she is aware of bili result and that bili blanket will be delivered today.went over how to use it and is aware to go for recheck tomorrow morning at the lab.     Will call back if needed.   
145

## 2025-04-01 NOTE — ASU DISCHARGE PLAN (ADULT/PEDIATRIC). - C. MAKE IMPORTANT PERSONAL OR BUSINESS DECISIONS
In an effort to ensure that our patients [employer], a Team Member has reviewed your chart and identified an opportunity to provide the best care possible. An attempt was made to discuss or schedule due or overdue Preventive or Chronic Condition care.Care Gaps identified: Diabetes Eye Exam.    The Outcome was Contact was not made, left message. We are attempting to schedule a Eye Exam appointment. If you have any questions or need help with scheduling, contact our Health Outreach Team at [phone].   Type of Appointment needed: Specialist Visit  
Statement Selected

## 2025-06-03 NOTE — H&P ADULT - PROBLEM/PLAN-4
[Patient Declined  Services] : - None: Patient declined  services [TWNoteComboBox1] : English DISPLAY PLAN FREE TEXT